# Patient Record
Sex: FEMALE | Race: WHITE
[De-identification: names, ages, dates, MRNs, and addresses within clinical notes are randomized per-mention and may not be internally consistent; named-entity substitution may affect disease eponyms.]

---

## 2023-10-27 ENCOUNTER — HOSPITAL ENCOUNTER (EMERGENCY)
Age: 33
Discharge: HOME | End: 2023-10-27
Payer: COMMERCIAL

## 2023-10-27 VITALS — BODY MASS INDEX: 34.9 KG/M2

## 2023-10-27 VITALS
RESPIRATION RATE: 16 BRPM | SYSTOLIC BLOOD PRESSURE: 148 MMHG | DIASTOLIC BLOOD PRESSURE: 91 MMHG | OXYGEN SATURATION: 98 % | HEART RATE: 87 BPM | TEMPERATURE: 98 F

## 2023-10-27 DIAGNOSIS — M79.672: Primary | ICD-10-CM

## 2023-10-27 PROCEDURE — 99281 EMR DPT VST MAYX REQ PHY/QHP: CPT

## 2023-10-27 PROCEDURE — 73630 X-RAY EXAM OF FOOT: CPT

## 2023-10-27 PROCEDURE — 99283 EMERGENCY DEPT VISIT LOW MDM: CPT

## 2025-01-30 ENCOUNTER — HOSPITAL ENCOUNTER (INPATIENT)
Dept: HOSPITAL 73 - ED | Age: 35
LOS: 4 days | Discharge: HOME | DRG: 59 | End: 2025-02-03
Payer: COMMERCIAL

## 2025-01-30 VITALS — SYSTOLIC BLOOD PRESSURE: 129 MMHG | HEART RATE: 104 BPM | DIASTOLIC BLOOD PRESSURE: 67 MMHG | OXYGEN SATURATION: 96 %

## 2025-01-30 VITALS — OXYGEN SATURATION: 96 % | SYSTOLIC BLOOD PRESSURE: 142 MMHG | DIASTOLIC BLOOD PRESSURE: 82 MMHG | HEART RATE: 102 BPM

## 2025-01-30 VITALS — OXYGEN SATURATION: 97 % | HEART RATE: 117 BPM

## 2025-01-30 VITALS
OXYGEN SATURATION: 99 % | SYSTOLIC BLOOD PRESSURE: 143 MMHG | RESPIRATION RATE: 16 BRPM | DIASTOLIC BLOOD PRESSURE: 88 MMHG | HEART RATE: 112 BPM

## 2025-01-30 VITALS — SYSTOLIC BLOOD PRESSURE: 153 MMHG | DIASTOLIC BLOOD PRESSURE: 84 MMHG | HEART RATE: 73 BPM

## 2025-01-30 VITALS — HEART RATE: 95 BPM | OXYGEN SATURATION: 98 % | DIASTOLIC BLOOD PRESSURE: 90 MMHG | SYSTOLIC BLOOD PRESSURE: 134 MMHG

## 2025-01-30 VITALS — OXYGEN SATURATION: 100 % | HEART RATE: 86 BPM

## 2025-01-30 VITALS — OXYGEN SATURATION: 97 % | DIASTOLIC BLOOD PRESSURE: 108 MMHG | HEART RATE: 82 BPM | SYSTOLIC BLOOD PRESSURE: 159 MMHG

## 2025-01-30 VITALS — HEART RATE: 86 BPM | SYSTOLIC BLOOD PRESSURE: 161 MMHG | DIASTOLIC BLOOD PRESSURE: 88 MMHG | OXYGEN SATURATION: 98 %

## 2025-01-30 VITALS — HEART RATE: 132 BPM | OXYGEN SATURATION: 93 %

## 2025-01-30 VITALS — OXYGEN SATURATION: 97 % | DIASTOLIC BLOOD PRESSURE: 93 MMHG | HEART RATE: 94 BPM | SYSTOLIC BLOOD PRESSURE: 182 MMHG

## 2025-01-30 VITALS — OXYGEN SATURATION: 95 % | HEART RATE: 114 BPM

## 2025-01-30 VITALS — SYSTOLIC BLOOD PRESSURE: 159 MMHG | OXYGEN SATURATION: 97 % | HEART RATE: 84 BPM | DIASTOLIC BLOOD PRESSURE: 85 MMHG

## 2025-01-30 VITALS — OXYGEN SATURATION: 97 % | SYSTOLIC BLOOD PRESSURE: 143 MMHG | DIASTOLIC BLOOD PRESSURE: 88 MMHG | HEART RATE: 117 BPM

## 2025-01-30 VITALS — SYSTOLIC BLOOD PRESSURE: 147 MMHG | HEART RATE: 109 BPM | DIASTOLIC BLOOD PRESSURE: 78 MMHG | OXYGEN SATURATION: 97 %

## 2025-01-30 VITALS — OXYGEN SATURATION: 98 % | DIASTOLIC BLOOD PRESSURE: 84 MMHG | SYSTOLIC BLOOD PRESSURE: 146 MMHG | HEART RATE: 105 BPM

## 2025-01-30 VITALS — OXYGEN SATURATION: 98 % | SYSTOLIC BLOOD PRESSURE: 150 MMHG | DIASTOLIC BLOOD PRESSURE: 92 MMHG | HEART RATE: 85 BPM

## 2025-01-30 VITALS — SYSTOLIC BLOOD PRESSURE: 156 MMHG | HEART RATE: 112 BPM | OXYGEN SATURATION: 97 % | DIASTOLIC BLOOD PRESSURE: 78 MMHG

## 2025-01-30 VITALS
TEMPERATURE: 97.34 F | DIASTOLIC BLOOD PRESSURE: 126 MMHG | OXYGEN SATURATION: 99 % | RESPIRATION RATE: 14 BRPM | SYSTOLIC BLOOD PRESSURE: 169 MMHG | HEART RATE: 96 BPM

## 2025-01-30 VITALS — DIASTOLIC BLOOD PRESSURE: 77 MMHG | HEART RATE: 90 BPM | OXYGEN SATURATION: 97 % | SYSTOLIC BLOOD PRESSURE: 180 MMHG

## 2025-01-30 VITALS — OXYGEN SATURATION: 98 % | HEART RATE: 97 BPM | DIASTOLIC BLOOD PRESSURE: 78 MMHG | SYSTOLIC BLOOD PRESSURE: 150 MMHG

## 2025-01-30 VITALS — HEART RATE: 96 BPM | OXYGEN SATURATION: 97 % | DIASTOLIC BLOOD PRESSURE: 82 MMHG | SYSTOLIC BLOOD PRESSURE: 141 MMHG

## 2025-01-30 VITALS — DIASTOLIC BLOOD PRESSURE: 79 MMHG | OXYGEN SATURATION: 96 % | SYSTOLIC BLOOD PRESSURE: 161 MMHG | HEART RATE: 87 BPM

## 2025-01-30 VITALS — HEART RATE: 80 BPM | DIASTOLIC BLOOD PRESSURE: 82 MMHG | OXYGEN SATURATION: 99 % | SYSTOLIC BLOOD PRESSURE: 173 MMHG

## 2025-01-30 VITALS — HEART RATE: 88 BPM | OXYGEN SATURATION: 98 %

## 2025-01-30 VITALS — SYSTOLIC BLOOD PRESSURE: 132 MMHG | OXYGEN SATURATION: 98 % | HEART RATE: 93 BPM | DIASTOLIC BLOOD PRESSURE: 74 MMHG

## 2025-01-30 VITALS — BODY MASS INDEX: 35.9 KG/M2 | BODY MASS INDEX: 35.7 KG/M2

## 2025-01-30 VITALS — HEART RATE: 96 BPM | OXYGEN SATURATION: 98 %

## 2025-01-30 VITALS — OXYGEN SATURATION: 98 % | HEART RATE: 94 BPM

## 2025-01-30 VITALS — HEART RATE: 101 BPM | OXYGEN SATURATION: 97 %

## 2025-01-30 VITALS — OXYGEN SATURATION: 98 % | HEART RATE: 77 BPM

## 2025-01-30 DIAGNOSIS — Z91.141: ICD-10-CM

## 2025-01-30 DIAGNOSIS — Z98.890: ICD-10-CM

## 2025-01-30 DIAGNOSIS — Z88.0: ICD-10-CM

## 2025-01-30 DIAGNOSIS — T45.1X6A: ICD-10-CM

## 2025-01-30 DIAGNOSIS — Z87.891: ICD-10-CM

## 2025-01-30 DIAGNOSIS — G35: Primary | ICD-10-CM

## 2025-01-30 DIAGNOSIS — E23.2: ICD-10-CM

## 2025-01-30 LAB
ADD MANUAL DIFF / SLIDE REVIEW: NO
ALBUMIN SERPL-MCNC: 4.8 G/DL (ref 3.5–5)
ALBUMIN/GLOB SERPL: 1.3 {RATIO} (ref 1–2.8)
ALP SERPL-CCNC: 71 U/L (ref 38–126)
ALT SERPL-CCNC: 29 IU/L (ref ?–35)
BUN SERPL-MCNC: 7 MG/DL (ref 7–17)
CALCIUM SERPL-MCNC: 9.5 MG/DL (ref 8.4–10.2)
CHLORIDE SERPL-SCNC: 106 MMOL/L (ref 98–107)
CO2 SERPL-SCNC: 24 MMOL/L (ref 22–32)
ESTIMATED GLOMERULAR FILT RATE: > 60 ML/MIN (ref 60–?)
GLOBULIN SER CALC-MCNC: 3.7 G/DL (ref 1.7–4.1)
GLUCOSE SERPL-MCNC: 103 MG/DL (ref 70–100)
HEMATOCRIT: 42.6 % (ref 36–46)
HEMOGLOBIN: 14.8 G/DL (ref 12–16)
HEMOLYSIS: 31 (ref 0–50)
LYMPHOCYTES # SPEC AUTO: 1400 /UL (ref 1100–4500)
MCV RBC: 87.4 FL (ref 80–100)
MEAN CORPUSCULAR HEMOGLOBIN: 30.3 PG (ref 26–34)
MEAN CORPUSCULAR HGB CONC: 34.6 % (ref 30–36)
PLATELET COUNT: 359 X10^3/UL (ref 150–400)
POTASSIUM SERPL-SCNC: 4.2 MMOL/L (ref 3.4–5.1)
PROT SERPL-MCNC: 8.5 G/DL (ref 6.3–8.2)
SODIUM SERPL-SCNC: 140 MMOL/L (ref 137–145)

## 2025-01-30 PROCEDURE — 97535 SELF CARE MNGMENT TRAINING: CPT

## 2025-01-30 PROCEDURE — 97116 GAIT TRAINING THERAPY: CPT

## 2025-01-30 PROCEDURE — 97165 OT EVAL LOW COMPLEX 30 MIN: CPT

## 2025-01-30 PROCEDURE — 80053 COMPREHEN METABOLIC PANEL: CPT

## 2025-01-30 PROCEDURE — 36415 COLL VENOUS BLD VENIPUNCTURE: CPT

## 2025-01-30 PROCEDURE — 81003 URINALYSIS AUTO W/O SCOPE: CPT

## 2025-01-30 PROCEDURE — 81015 MICROSCOPIC EXAM OF URINE: CPT

## 2025-01-30 PROCEDURE — 85025 COMPLETE CBC W/AUTO DIFF WBC: CPT

## 2025-01-30 PROCEDURE — 99284 EMERGENCY DEPT VISIT MOD MDM: CPT

## 2025-01-30 PROCEDURE — 96375 TX/PRO/DX INJ NEW DRUG ADDON: CPT

## 2025-01-30 PROCEDURE — 72156 MRI NECK SPINE W/O & W/DYE: CPT

## 2025-01-30 PROCEDURE — 96361 HYDRATE IV INFUSION ADD-ON: CPT

## 2025-01-30 PROCEDURE — 80048 BASIC METABOLIC PNL TOTAL CA: CPT

## 2025-01-30 PROCEDURE — 70553 MRI BRAIN STEM W/O & W/DYE: CPT

## 2025-01-30 PROCEDURE — 81025 URINE PREGNANCY TEST: CPT

## 2025-01-30 PROCEDURE — 97162 PT EVAL MOD COMPLEX 30 MIN: CPT

## 2025-01-30 PROCEDURE — 96365 THER/PROPH/DIAG IV INF INIT: CPT

## 2025-01-30 RX ADMIN — METHYLPREDNISOLONE SODIUM SUCCINATE 250 MG: 1 INJECTION, POWDER, FOR SOLUTION INTRAMUSCULAR; INTRAVENOUS at 13:03

## 2025-01-30 NOTE — DI.MRI.S_ITS
PROCEDURE:  MR CERVICAL SPINE WO/W CON 
  
INDICATIONS:  ms flare 
  
TECHNIQUE:   
Noncontrast sagittal T1 spin echo and T2 fast spin echo, sagittal STIR, sagittal PD fast  
spin echo, foraminal oblique sagittal T2 fast spin echo, axial gradient echo or T2 fast  
spin echo through the cervical spine.  After the administration of contrast, sagittal and  
axial T1 spin echo with fat saturation through the cervical spine.   
  
COMPARISON:  [facility], MR, MR CERVICAL SPINE WITH/WITHOUT CONTRAST,  
11/04/2019, 9:09. 
  
FINDINGS:   
Image quality:  Motion degraded.   
  
Alignment and curvature:  Straightening and mild reversal the normal cervical lordosis. 
  
Marrow:  Marrow demonstrates normal overall signal.   
  
Spinal cord:  Again seen are multiple large lesions throughout the cervical cord and  
visualized upper thoracic cord.  These appear progressed compared to prior with near  
continuous lesions spanning C2 through C7 (15/10).  No enhancing lesions are seen. 
  
Paraspinous soft tissues:  No paravertebral masses or suspicious enhancement.   
  
IMPRESSION:   
  
Multiple lesions throughout the cervical cord and upper thoracic cord appear increased  
compared to prior exam with near continuous lesion spanning C2 through C7.  No enhancing  
lesions are identified.   
  
  
Dictated by: Keyon Danielle M.D. on 1/30/2025 at 15:38      
Approved by: Keyon Danielle M.D. on 1/30/2025 at 15:42

## 2025-01-30 NOTE — ED.NEUROSD
HPI - Neuro Symptoms/Deficit
<Fiordaliza Urbina DO - Last Filed: 02/04/25 21:38>
General
Chief Complaint: Neuro Symptoms/Deficit
Stated Complaint: MS flare
Time Seen by Provider: 01/30/25 11:52
Mode of arrival: Wheelchair
History of Present Illness
HPI Narrative: 
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  She reports increasing left-sided weakness it has been ongoing for 
couple of weeks.  She has been unable to afford her MS medication cut the co-pay ran out.  She is followed by  at [facility].  She denies any fever chills cough sore throat or infection like symptoms
Related Data
Home Medications

 Medication  Instructions  Recorded  Confirmed
desmopressin 0.1 mg tablet 0.05 mg PO BID 01/31/25 01/31/25
ergocalciferol (vitamin D2) 1,250 1,250 mcg PO DAILY 01/31/25 01/31/25
mcg (50,000 unit) capsule   


Allergies

Allergy/AdvReac Type Severity Reaction Status Date / Time
adhesive tape Allergy  Rash Verified 01/30/25 10:36
lavender (Lavandula Allergy  Migraine Verified 01/30/25 10:36
angustifolia)     
Penicillins Allergy  Hives Verified 01/30/25 10:36



Patient History
<Fiordaliza Urbina DO - Last Filed: 02/04/25 21:38>
Social History (Reviewed 02/02/25 @ 09:16 by Bryant Hartmann MD)
household members:  spouse, children and other 
Smoking Status:  Former smoker 
alcohol intake:  current 


Smoking Status: Former smoker
alcohol intake frequency: holidays/special occasions only

Exam
<Fiordaliza Urbina DO - Last Filed: 02/04/25 21:38>
Initial Vital Signs
Initial Vital Signs: 

Vital Signs

Temperature  97.3 F L  01/30/25 10:36
Pulse Rate  96 H  01/30/25 10:36
Respiratory Rate  14   01/30/25 10:36
Blood Pressure  169/126 H  01/30/25 10:36
Pulse Oximetry  99   01/30/25 10:36
Oxygen Delivery Method  Room Air  01/30/25 10:36


GENERAL:  Alert pleasant well-appearing 34-year-old female and in [no acute] distress.
HEENT: Head atraumatic,EOMI, pupils reactive, face symmetric, [moist] mucous membranes  
CARDIOVASCULAR: Regular rate and rhythm without murmurs, rubs or gallops.
RESPIRATORY: Breath sounds equal bilaterally, no wheezes rales or rhonchi.
ABDOMEN: Soft, nontender.  Normoactive bowel sounds all 4 quadrants.  No guarding or rebound.
EXTREMITIES: Normal range of motion, no clubbing or edema.  Neurovascularly intact
NEUROLOGICAL: Alert and oriented x4.Normal gait and speech. Cranial nerves II through XII grossly intact.   [Good finger-to-nose, good heel-to-shin, strength equal bilaterally, no dysarthria or aphasia, sensation in tact to soft touch bilaterally, 
no visual changes, no facial droop]
SKIN: Warm, dry, no laceration, no petechiae, no rashes or lesions.

<Shirley Celaya MD - Last Filed: 01/31/25 02:25>
Initial Vital Signs
Initial Vital Signs: 

Vital Signs

Temperature  97.3 F L  01/30/25 10:36
Pulse Rate  96 H  01/30/25 10:36
Respiratory Rate  14   01/30/25 10:36
Blood Pressure  169/126 H  01/30/25 10:36
Pulse Oximetry  99   01/30/25 10:36
Oxygen Delivery Method  Room Air  01/30/25 10:36




Course
<Fiordaliza Urbina DO - Last Filed: 02/04/25 21:38>
Orders
Ordered: 




Discontinued Medications

Acetaminophen (Acetaminophen 325 Mg Tablet)  650 mg PO Q6H PRN
 PRN Reason: Fever/Mild Pain (1-3)
Hydrocodone Bitart/Acetaminophen (Hydrocodone/Acet 5/325 Tablet)  1 tab PO Q4H PRN
 PRN Reason: Pain, Moderate (4-6)
Methylprednisolone 1,000 mg/ (Sodium Chloride)  250 mls @ 250 mls/hr IV NOW ONE
 Stop: 01/30/25 12:44
 Last Infusion: 01/30/25 14:04  Dose: Infused
 Documented By: FLLESIA
 Admin: 01/30/25 13:03  Dose: 250 mls/hr
 Documented By: JEREMIAH
Sodium Chloride (Normal Saline 0.9%)  1,000 mls @ 75 mls/hr IV CONT ALEX
 Last Admin: 01/31/25 19:43  Dose: 75 mls/hr
 Documented By: LINH
 Infusion: 01/31/25 16:51  Dose: Infused
 Documented By: LINH
 Admin: 01/31/25 03:31  Dose: 75 mls/hr
 Documented By: EZIO
Methylprednisolone 1,000 mg/ (Sodium Chloride)  250 mls @ 250 mls/hr IV Q24H [facility]
 Last Infusion: 02/03/25 13:38  Dose: Infused
 Documented By: TLS
 Admin: 02/03/25 12:01  Dose: 250 mls/hr
 Documented By: CR
 Infusion: 02/02/25 15:01  Dose: Infused
 Documented By: CR
 Admin: 02/02/25 13:28  Dose: 250 mls/hr
 Documented By: CR
 Infusion: 02/01/25 14:06  Dose: Infused
 Documented By: ANCA
 Admin: 02/01/25 13:06  Dose: 250 mls/hr
 Documented By: JANE
 Infusion: 01/31/25 14:22  Dose: Infused
 Documented By: JANE
 Admin: 01/31/25 13:22  Dose: 250 mls/hr
 Documented By: LINH
Lorazepam (Lorazepam 2 Mg/Ml Inj)  1 mg IV NOW ONE
 Stop: 01/30/25 14:22
 Last Admin: 01/30/25 14:30  Dose: 1 mg
 Documented By: NACHO
Lorazepam (Lorazepam 2 Mg/Ml Inj)  1 mg IV Q6HR PRN
 PRN Reason: Anxiety
Methylprednisolone (Methylprednisolone 125 Mg/2 Ml Vial)  1,000 mg IV Q24H [facility]
 Last Admin: 01/31/25 12:52 Dose:  Not Given
 Documented By: LINH
Naloxone HCl (Naloxone 0.4 Mg/Ml Vial)  0.2 mg IV Q2MIN PRN
 PRN Reason: Opiate Reversal
Ondansetron HCl (Ondansetron 4 Mg/2 Ml Inj)  4 mg IV Q8HR PRN
 PRN Reason: Nausea And Vomiting
Pantoprazole Sodium (Pantoprazole Dr 40 Mg Tablet)  40 mg PO 0700 [facility]
 Last Admin: 02/03/25 06:04  Dose: 40 mg
 Documented By: MAU
 Admin: 02/02/25 06:33  Dose: 40 mg
 Documented By: MD
 Admin: 02/01/25 06:54  Dose: 40 mg
 Documented By: MD
 Admin: 01/31/25 08:32  Dose: 40 mg
 Documented By: SPF



Vital Signs
Vital signs: 

Vital Signs - 8 hr

 01/30/25
18:30 01/30/25
18:30 01/30/25
19:00
Pulse Rate 105 H  102 H
Respiratory Rate   
Blood Pressure  146/84 H 
Pulse Oximetry 98  96
Oxygen Delivery Method   

 01/30/25
19:00 01/30/25
19:30 01/30/25
19:30
Pulse Rate  112 H 130 H
Respiratory Rate  16 
Blood Pressure 142/82 H 143/88 H 
Pulse Oximetry  99 96
Oxygen Delivery Method  Room Air 

 01/30/25
19:31 01/30/25
19:31 01/30/25
20:00
Pulse Rate 117 H  104 H
Respiratory Rate   
Blood Pressure  143/88 H 
Pulse Oximetry 97  96
Oxygen Delivery Method   

 01/30/25
20:00 01/30/25
20:04 01/30/25
20:04
Pulse Rate   109 H
Respiratory Rate   
Blood Pressure 129/67 147/78 H 
Pulse Oximetry   97
Oxygen Delivery Method   

 01/30/25
20:30 01/30/25
21:04 01/30/25
21:17
Pulse Rate 114 H 132 H 112 H
Respiratory Rate   
Blood Pressure   
Pulse Oximetry 95 93 97
Oxygen Delivery Method   

 01/30/25
21:17 01/30/25
21:30
Pulse Rate  117 H
Respiratory Rate  
Blood Pressure 156/78 H 
Pulse Oximetry  97
Oxygen Delivery Method  




<Shirley Celaya MD - Last Filed: 01/31/25 02:25>
Orders
Ordered: 




Discontinued Medications

Acetaminophen (Acetaminophen 325 Mg Tablet)  650 mg PO Q6H PRN
 PRN Reason: Fever/Mild Pain (1-3)
Hydrocodone Bitart/Acetaminophen (Hydrocodone/Acet 5/325 Tablet)  1 tab PO Q4H PRN
 PRN Reason: Pain, Moderate (4-6)
Methylprednisolone 1,000 mg/ (Sodium Chloride)  250 mls @ 250 mls/hr IV NOW ONE
 Stop: 01/30/25 12:44
 Last Infusion: 01/30/25 14:04  Dose: Infused
 Documented By: MICHELLE
 Admin: 01/30/25 13:03  Dose: 250 mls/hr
 Documented By: JEREMIAH
Sodium Chloride (Normal Saline 0.9%)  1,000 mls @ 75 mls/hr IV CONT ALXE
 Last Admin: 01/31/25 19:43  Dose: 75 mls/hr
 Documented By: LINH
 Infusion: 01/31/25 16:51  Dose: Infused
 Documented By: LINH
 Admin: 01/31/25 03:31  Dose: 75 mls/hr
 Documented By: EZIO
Methylprednisolone 1,000 mg/ (Sodium Chloride)  250 mls @ 250 mls/hr IV Q24H [facility]
 Last Infusion: 02/03/25 13:38  Dose: Infused
 Documented By: MISHA
 Admin: 02/03/25 12:01  Dose: 250 mls/hr
 Documented By: ANCA
 Infusion: 02/02/25 15:01  Dose: Infused
 Documented By: ANCA
 Admin: 02/02/25 13:28  Dose: 250 mls/hr
 Documented By: ANCA
 Infusion: 02/01/25 14:06  Dose: Infused
 Documented By: ANCA
 Admin: 02/01/25 13:06  Dose: 250 mls/hr
 Documented By: JANE
 Infusion: 01/31/25 14:22  Dose: Infused
 Documented By: JANE
 Admin: 01/31/25 13:22  Dose: 250 mls/hr
 Documented By: LINH
Lorazepam (Lorazepam 2 Mg/Ml Inj)  1 mg IV NOW ONE
 Stop: 01/30/25 14:22
 Last Admin: 01/30/25 14:30  Dose: 1 mg
 Documented By: NACHO
Lorazepam (Lorazepam 2 Mg/Ml Inj)  1 mg IV Q6HR PRN
 PRN Reason: Anxiety
Methylprednisolone (Methylprednisolone 125 Mg/2 Ml Vial)  1,000 mg IV Q24H [facility]
 Last Admin: 01/31/25 12:52 Dose:  Not Given
 Documented By: LINH
Naloxone HCl (Naloxone 0.4 Mg/Ml Vial)  0.2 mg IV Q2MIN PRN
 PRN Reason: Opiate Reversal
Ondansetron HCl (Ondansetron 4 Mg/2 Ml Inj)  4 mg IV Q8HR PRN
 PRN Reason: Nausea And Vomiting
Pantoprazole Sodium (Pantoprazole Dr 40 Mg Tablet)  40 mg PO 0700 [facility]
 Last Admin: 02/03/25 06:04  Dose: 40 mg
 Documented By: MAU
 Admin: 02/02/25 06:33  Dose: 40 mg
 Documented By: MD
 Admin: 02/01/25 06:54  Dose: 40 mg
 Documented By: MD
 Admin: 01/31/25 08:32  Dose: 40 mg
 Documented By: SPF



Vital Signs
Vital signs: 

Vital Signs - 8 hr

 01/30/25
18:30 01/30/25
18:30 01/30/25
19:00
Pulse Rate 105 H  102 H
Respiratory Rate   
Blood Pressure  146/84 H 
Pulse Oximetry 98  96
Oxygen Delivery Method   

 01/30/25
19:00 01/30/25
19:30 01/30/25
19:30
Pulse Rate  112 H 130 H
Respiratory Rate  16 
Blood Pressure 142/82 H 143/88 H 
Pulse Oximetry  99 96
Oxygen Delivery Method  Room Air 

 01/30/25
19:31 01/30/25
19:31 01/30/25
20:00
Pulse Rate 117 H  104 H
Respiratory Rate   
Blood Pressure  143/88 H 
Pulse Oximetry 97  96
Oxygen Delivery Method   

 01/30/25
20:00 01/30/25
20:04 01/30/25
20:04
Pulse Rate   109 H
Respiratory Rate   
Blood Pressure 129/67 147/78 H 
Pulse Oximetry   97
Oxygen Delivery Method   

 01/30/25
20:30 01/30/25
21:04 01/30/25
21:17
Pulse Rate 114 H 132 H 112 H
Respiratory Rate   
Blood Pressure   
Pulse Oximetry 95 93 97
Oxygen Delivery Method   

 01/30/25
21:17 01/30/25
21:30
Pulse Rate  117 H
Respiratory Rate  
Blood Pressure 156/78 H 
Pulse Oximetry  97
Oxygen Delivery Method  




MDM - Neuro Symptoms/Deficit
<Fiordaliza Urbina, DO - Last Filed: 02/04/25 21:38>
Lab Data

01/31/25 05:55          

01/31/25 05:55          

Labs: 

Lab Results

  01/30/25 01/30/25 Range/Units
  12:15 12:37 
WBC   11.2 H  (4.5-11.0)  X10^3/uL
RBC   4.87  (4.0-5.2)  X10^6/uL
Hgb   14.8  (12.0-16.0)  g/dL
Hct   42.6  (36-46)  %
MCV   87.4  ()  fL
MCH   30.3  (26-34)  PG
MCHC   34.6  (30-36)  %
RDW   14.5  (11.6-14.8)  %
Plt Count   359  (150-400)  X10^3/uL
Neut % (Auto)   77.8 H  (50-75)  %
Lymph % (Auto)   13.0 L  (25-40)  %
Mono % (Auto)   5.9  (3-14)  %
Eos % (Auto)   2.7  (2-4)  %
Baso % (Auto)   0.6  (0-2)  %
Neut # (Auto)   8700 H  (3889-6507)  /uL
Lymph # (Auto)   1400  (6688-8431)  /uL
Mono # (Auto)   700  (0-900)  /uL
Eos # (Auto)   300  (0-450)  /uL
Baso # (Auto)   100  (0-100)  /uL
Sodium   140  (137-145)  mmol/L
Potassium   4.2  (3.4-5.1)  mmol/L
Chloride   106  ()  mmol/L
Carbon Dioxide   24  (22-32)  mmol/L
BUN   7  (7-17)  mg/dL
Creatinine   0.80  (0.52-1.04)  mg/dL
Estimated GFR   > 60  (>60)  mL/min
BUN/Creatinine Ratio   8.8  (6-22)  
Glucose   103 H  ()  mg/dL
Calcium   9.5  (8.4-10.2)  mg/dL
Total Bilirubin   1.0  (0.2-1.3)  mg/dL
AST   33  (14-36)  IU/L
ALT   29  (<35)  IU/L
Alkaline Phosphatase   71  ()  U/L
Total Protein   8.5 H  (6.3-8.2)  g/dL
Albumin   4.8  (3.5-5.0)  g/dL
Globulin   3.7  (1.7-4.1)  g/dL
Albumin/Globulin Ratio   1.3  (1.0-2.8)  
Urine RBC  0-1/hpf   (0-5/HPF)  
Urine WBC  1-5/hpf   (0-5/HPF)  
Ur Squamous Epith Cells  1-5 /hpf   (0-5/HPF)  
Urine Bacteria  Few (2-10) H   (None)  
Ur Culture Indicated?  Cult not indicated   
Vol Urine Centrifuged  10ml (spun)   

Point of Care Testing

Pregnancy Test Results         Negative                                         

Urine Dip

Bedside Urine Glucose          Negative                                          
Bedside Urine Bilirubin        - Negative                                        
Bedside Urine Ketone           - Negative                                        
Urine Specific Gravity         1.005                                             
Bedside Urine Occult Blood     - Negative                                        
Bedside Urine pH               7.0                                               
Bedside Urine Protein          - Negative                                        
Bedside Urine Urobilinogen     - Negative                                        
Bedside Urine Nitrite          - Negative                                        
Bedside Urine Leukocytes       +/- 15                                            
Esterase                       




Imaging Data
MR brain: 
      Radiologist's Impression: 





PROCEDURE:  MR HEAD/BRAIN WO/W CON
 
INDICATIONS:  ms flare
 
TECHNIQUE:  
Noncontrast sagittal and axial FLAIR, axial and coronal T2 fast spin echo, axial VIBE, 
axial gradient echo, axial diffusion and ADC through the brain.  After the administration 
of contrast, axial and coronal and sagittal VIBE with fat saturation through the brain.  
 
COMPARISON:  [facility], , MR MS BRAIN WITH/WITHOUT CONTRAST, 11/04/2019, 
9:09.
 
FINDINGS:  
Image quality:  Excellent.  
 
CSF spaces:  Ventricles are normal in size and shape.  Basal cisterns are patent.  No 
extra-axial fluid collections.  
 
Brain:  Compared to 11/04/2019, there is significant progression of subcortical and 
periventricular white matter lesions with involvement of the corpus callosum.  There are 
also several new infratentorial lesions including lesions in the brainstem.  Larger 
lesions demonstrate T1 hypointense signal.  No intracranial bleeds or mass effects.  
Gray-white matter interface appears intact. No abnormal intracranial enhancement.  
Diffusion weighted images show no acute ischemic insults.  Brainstem appears normal.  
Normal intravascular flow voids are present.  
 
Skull and face:  Calvarial marrow signal is normal.  Orbits appear normal.  
 
Sinuses:  Mild right greater than left maxillary sinus mucosal thickening.  Sinuses and 
mastoids are otherwise clear.  
 
 
IMPRESSION:  
 
Significant progression of white matter lesions compared to 11/04/2019.  No enhancing or 
diffusion restricting lesions are seen.
 
Tumefactive demyelinating lesions are not excluded for some of the larger lesions and 
short-term follow-up brain MRI with and without contrast in 3-6 months is recommended.
 
 
 
Dictated by: Keyon Danielle M.D. on 1/30/2025 at 15:29     
MR Cervical: 
      Radiologist's Impression: 




PROCEDURE:  MR CERVICAL SPINE WO/W CON
 
INDICATIONS:  ms flare
 
TECHNIQUE:  
Noncontrast sagittal T1 spin echo and T2 fast spin echo, sagittal STIR, sagittal PD fast 
spin echo, foraminal oblique sagittal T2 fast spin echo, axial gradient echo or T2 fast 
spin echo through the cervical spine.  After the administration of contrast, sagittal and 
axial T1 spin echo with fat saturation through the cervical spine.  
 
COMPARISON:  [facility], MR, MR CERVICAL SPINE WITH/WITHOUT CONTRAST, 
11/04/2019, 9:09.
 
FINDINGS:  
Image quality:  Motion degraded.  
 
Alignment and curvature:  Straightening and mild reversal the normal cervical lordosis.
 
Marrow:  Marrow demonstrates normal overall signal.  
 
Spinal cord:  Again seen are multiple large lesions throughout the cervical cord and 
visualized upper thoracic cord.  These appear progressed compared to prior with near 
continuous lesions spanning C2 through C7 (15/10).  No enhancing lesions are seen.
 
Paraspinous soft tissues:  No paravertebral masses or suspicious enhancement.  
 
IMPRESSION:  
 
Multiple lesions throughout the cervical cord and upper thoracic cord appear increased 
compared to prior exam with near continuous lesion spanning C2 through C7.  No enhancing 
lesions are identified.  
 
 
Dictated by: Keyon Danielle M.D. on 1/30/2025 at 15:38     
Approved by: Keyon Danielle M.D. on 1/30/2025 at 15:42   
[facility] Narrative
Medical decision making narrative: 
Patient is a 34-year-old female presenting to day with worsening left-sided weakness.  She was felt an MS flare coming on for some time.  Unable to afford her medication.
Blood work today is overall reassuring mild leukocytosis of 11.2 electrolytes stable no EDGAR urinalysis negative for UTI

12:40 Dr. Hunt, neurology updated patient's symptoms test results reports that she has not been seen since April would really like an MRI brain and C-spine with and without contrast.  But does agree with 3 days of IV Solu-Medrol.

Patient reports that she has nipple rings that have grown over and unable to get an MRI.
Actually they are MRI compatible able to get MRI quickly

, request transfer for plasmapheresis based on MRI
System at capacity all hospitalist are full.




<Shirley Celaya MD - Last Filed: 01/31/25 02:25>
Lab Data
Labs: 

Lab Results

  01/30/25 01/30/25 Range/Units
  12:15 12:37 
WBC   11.2 H  (4.5-11.0)  X10^3/uL
RBC   4.87  (4.0-5.2)  X10^6/uL
Hgb   14.8  (12.0-16.0)  g/dL
Hct   42.6  (36-46)  %
MCV   87.4  ()  fL
MCH   30.3  (26-34)  PG
MCHC   34.6  (30-36)  %
RDW   14.5  (11.6-14.8)  %
Plt Count   359  (150-400)  X10^3/uL
Neut % (Auto)   77.8 H  (50-75)  %
Lymph % (Auto)   13.0 L  (25-40)  %
Mono % (Auto)   5.9  (3-14)  %
Eos % (Auto)   2.7  (2-4)  %
Baso % (Auto)   0.6  (0-2)  %
Neut # (Auto)   8700 H  (5809-1600)  /uL
Lymph # (Auto)   1400  (5289-4221)  /uL
Mono # (Auto)   700  (0-900)  /uL
Eos # (Auto)   300  (0-450)  /uL
Baso # (Auto)   100  (0-100)  /uL
Sodium   140  (137-145)  mmol/L
Potassium   4.2  (3.4-5.1)  mmol/L
Chloride   106  ()  mmol/L
Carbon Dioxide   24  (22-32)  mmol/L
BUN   7  (7-17)  mg/dL
Creatinine   0.80  (0.52-1.04)  mg/dL
Estimated GFR   > 60  (>60)  mL/min
BUN/Creatinine Ratio   8.8  (6-22)  
Glucose   103 H  ()  mg/dL
Calcium   9.5  (8.4-10.2)  mg/dL
Total Bilirubin   1.0  (0.2-1.3)  mg/dL
AST   33  (14-36)  IU/L
ALT   29  (<35)  IU/L
Alkaline Phosphatase   71  ()  U/L
Total Protein   8.5 H  (6.3-8.2)  g/dL
Albumin   4.8  (3.5-5.0)  g/dL
Globulin   3.7  (1.7-4.1)  g/dL
Albumin/Globulin Ratio   1.3  (1.0-2.8)  
Urine RBC  0-1/hpf   (0-5/HPF)  
Urine WBC  1-5/hpf   (0-5/HPF)  
Ur Squamous Epith Cells  1-5 /hpf   (0-5/HPF)  
Urine Bacteria  Few (2-10) H   (None)  
Ur Culture Indicated?  Cult not indicated   
Vol Urine Centrifuged  10ml (spun)   

Point of Care Testing

Pregnancy Test Results         Negative                                         

Urine Dip

Bedside Urine Glucose          Negative                                          
Bedside Urine Bilirubin        - Negative                                        
Bedside Urine Ketone           - Negative                                        
Urine Specific Gravity         1.005                                             
Bedside Urine Occult Blood     - Negative                                        
Bedside Urine pH               7.0                                               
Bedside Urine Protein          - Negative                                        
Bedside Urine Urobilinogen     - Negative                                        
Bedside Urine Nitrite          - Negative                                        
Bedside Urine Leukocytes       +/- 15                                            
Esterase                       




MDM Narrative
Medical decision making narrative: 
Patient is a 34-year-old female presenting to day with worsening left-sided weakness.  She was felt an MS flare coming on for some time.  Unable to afford her medication.
Blood work today is overall reassuring mild leukocytosis of 11.2 electrolytes stable no EDGAR urinalysis negative for UTI

12:40 Dr. Hunt, neurology updated patient's symptoms test results reports that she has not been seen since April would really like an MRI brain and C-spine with and without contrast.  But does agree with 3 days of IV Solu-Medrol.

Patient reports that she has nipple rings that have grown over and unable to get an MRI.
Actually they are MRI compatible able to get MRI quickly

, request transfer for plasmapheresis based on MRI
System at capacity all hospitalist are full.


Dr. Celaya - Care of patient signed to me by daytime physician. Pending transfer request.


[id number]  - Case discussed with [facility]. Pending review of images prior to paging neurologist.

0223 - Discussed with Dr. Garay of neurology at . He reviewed the images and the case - recommends IV steroids only. No need for transfer. No need for plasma exchange. Case discussed with Dr. Menjivar of tele-medicine, who will admit to [facility].

Discharge Plan
Departure
Patient Disposition: Admitted As Inpatient

Clinical Impression:
 Exacerbation of multiple sclerosis


Admit Date/Time: 01/31/25 02:31

Admit Provider: Giovani Menjivar

## 2025-01-30 NOTE — ED_ITS
HPI - Neuro Symptoms/Deficit    
<Fiordaliza Urbina DO - Last Filed: 02/04/25 21:38>    
General    
Chief Complaint: Neuro Symptoms/Deficit    
Stated Complaint: MS flare    
Time Seen by Provider: 01/30/25 11:52    
Mode of arrival: Wheelchair    
History of Present Illness    
HPI Narrative:     
Patient is a 34-year-old female history of multiple sclerosis, diabetes   
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   
thinks was an MS flare.  She reports increasing left-sided weakness it has been   
ongoing for couple of weeks.  She has been unable to afford her MS medication   
cut the co-pay ran out.  She is followed by  at [facility]   
Neurology.  She denies any fever chills cough sore throat or infection like   
symptoms    
Related Data    
                                Home Medications    
    
    
    
 Medication  Instructions  Recorded  Confirmed    
     
desmopressin 0.1 mg tablet 0.05 mg PO BID 01/31/25 01/31/25    
     
ergocalciferol (vitamin D2) 1,250 1,250 mcg PO DAILY 01/31/25 01/31/25    
    
mcg (50,000 unit) capsule       
    
    
    
                                    Allergies    
    
    
    
Allergy/AdvReac Type Severity Reaction Status Date / Time    
     
adhesive tape Allergy  Rash Verified 01/30/25 10:36    
     
lavender (Lavandula Allergy  Migraine Verified 01/30/25 10:36    
    
angustifolia)         
     
Penicillins Allergy  Hives Verified 01/30/25 10:36    
    
    
    
    
Patient History    
<Fiordaliza Urbina DO - Last Filed: 02/04/25 21:38>    
Social History (Reviewed 02/02/25 @ 09:16 by Bryant Hartmann MD)    
household members:  spouse, children and other     
Smoking Status:  Former smoker     
alcohol intake:  current     
    
    
Smoking Status: Former smoker    
alcohol intake frequency: holidays/special occasions only    
    
Exam    
<Fiordaliza Urbina DO - Last Filed: 02/04/25 21:38>    
Initial Vital Signs    
Initial Vital Signs:     
    
Vital Signs    
    
    
    
Temperature  97.3 F L  01/30/25 10:36    
     
Pulse Rate  96 H  01/30/25 10:36    
     
Respiratory Rate  14   01/30/25 10:36    
     
Blood Pressure  169/126 H  01/30/25 10:36    
     
Pulse Oximetry  99   01/30/25 10:36    
     
Oxygen Delivery Method  Room Air  01/30/25 10:36    
    
    
    
GENERAL:  Alert pleasant well-appearing 34-year-old female and in [no acute]   
distress.    
HEENT: Head atraumatic,EOMI, pupils reactive, face symmetric, [moist] mucous   
membranes      
CARDIOVASCULAR: Regular rate and rhythm without murmurs, rubs or gallops.    
RESPIRATORY: Breath sounds equal bilaterally, no wheezes rales or rhonchi.    
ABDOMEN: Soft, nontender.  Normoactive bowel sounds all 4 quadrants.  No   
guarding or rebound.    
EXTREMITIES: Normal range of motion, no clubbing or edema.  Neurovascularly   
intact    
NEUROLOGICAL: Alert and oriented x4.Normal gait and speech. Cranial nerves II   
through XII grossly intact.   [Good finger-to-nose, good heel-to-shin, strength   
equal bilaterally, no dysarthria or aphasia, sensation in tact to soft touch   
bilaterally, no visual changes, no facial droop]    
SKIN: Warm, dry, no laceration, no petechiae, no rashes or lesions.    
    
<Shirley Celaya MD - Last Filed: 01/31/25 02:25>    
Initial Vital Signs    
Initial Vital Signs:     
    
Vital Signs    
    
    
    
Temperature  97.3 F L  01/30/25 10:36    
     
Pulse Rate  96 H  01/30/25 10:36    
     
Respiratory Rate  14   01/30/25 10:36    
     
Blood Pressure  169/126 H  01/30/25 10:36    
     
Pulse Oximetry  99   01/30/25 10:36    
     
Oxygen Delivery Method  Room Air  01/30/25 10:36    
    
    
    
    
    
Course    
<Fiordaliza Urbina DO - Last Filed: 02/04/25 21:38>    
Orders    
Ordered:     
    
                                            
    
    
Discontinued Medications    
    
Acetaminophen (Acetaminophen 325 Mg Tablet)  650 mg PO Q6H PRN    
   PRN Reason: Fever/Mild Pain (1-3)    
Hydrocodone Bitart/Acetaminophen (Hydrocodone/Acet 5/325 Tablet)  1 tab PO Q4H   
PRN    
   PRN Reason: Pain, Moderate (4-6)    
Methylprednisolone 1,000 mg/ (Sodium Chloride)  250 mls @ 250 mls/hr IV NOW ONE    
   Stop: 01/30/25 12:44    
   Last Infusion: 01/30/25 14:04  Dose: Infused    
   Documented By: FLLESIA    
   Admin: 01/30/25 13:03  Dose: 250 mls/hr    
   Documented By: JEREMIAH    
Sodium Chloride (Normal Saline 0.9%)  1,000 mls @ 75 mls/hr IV CONT ALEX    
   Last Admin: 01/31/25 19:43  Dose: 75 mls/hr    
   Documented By: LINH    
   Infusion: 01/31/25 16:51  Dose: Infused    
   Documented By: LINH    
   Admin: 01/31/25 03:31  Dose: 75 mls/hr    
   Documented By: EZIO    
Methylprednisolone 1,000 mg/ (Sodium Chloride)  250 mls @ 250 mls/hr IV Q24H [facility]    
   Last Infusion: 02/03/25 13:38  Dose: Infused    
   Documented By: TLS    
   Admin: 02/03/25 12:01  Dose: 250 mls/hr    
   Documented By: CR    
   Infusion: 02/02/25 15:01  Dose: Infused    
   Documented By: CR    
   Admin: 02/02/25 13:28  Dose: 250 mls/hr    
   Documented By: CR    
   Infusion: 02/01/25 14:06  Dose: Infused    
   Documented By: ANCA    
   Admin: 02/01/25 13:06  Dose: 250 mls/hr    
   Documented By: JANE    
   Infusion: 01/31/25 14:22  Dose: Infused    
   Documented By: JANE    
   Admin: 01/31/25 13:22  Dose: 250 mls/hr    
   Documented By: LINH    
Lorazepam (Lorazepam 2 Mg/Ml Inj)  1 mg IV NOW ONE    
   Stop: 01/30/25 14:22    
   Last Admin: 01/30/25 14:30  Dose: 1 mg    
   Documented By: NACHO    
Lorazepam (Lorazepam 2 Mg/Ml Inj)  1 mg IV Q6HR PRN    
   PRN Reason: Anxiety    
Methylprednisolone (Methylprednisolone 125 Mg/2 Ml Vial)  1,000 mg IV Q24H [facility]    
   Last Admin: 01/31/25 12:52 Dose:  Not Given    
   Documented By: LINH    
Naloxone HCl (Naloxone 0.4 Mg/Ml Vial)  0.2 mg IV Q2MIN PRN    
   PRN Reason: Opiate Reversal    
Ondansetron HCl (Ondansetron 4 Mg/2 Ml Inj)  4 mg IV Q8HR PRN    
   PRN Reason: Nausea And Vomiting    
Pantoprazole Sodium (Pantoprazole Dr 40 Mg Tablet)  40 mg PO 0700 [facility]    
   Last Admin: 02/03/25 06:04  Dose: 40 mg    
   Documented By: MAU    
   Admin: 02/02/25 06:33  Dose: 40 mg    
   Documented By: MD    
   Admin: 02/01/25 06:54  Dose: 40 mg    
   Documented By: MD    
   Admin: 01/31/25 08:32  Dose: 40 mg    
   Documented By: SPF    
    
    
    
Vital Signs    
Vital signs:     
    
                               Vital Signs - 8 hr    
    
    
    
 01/30/25    
18:30 01/30/25    
18:30 01/30/25    
19:00    
     
Pulse Rate 105 H  102 H    
     
Respiratory Rate       
     
Blood Pressure  146/84 H     
     
Pulse Oximetry 98  96    
     
Oxygen Delivery Method       
    
    
    
    
 01/30/25    
19:00 01/30/25    
19:30 01/30/25    
19:30    
     
Pulse Rate  112 H 130 H    
     
Respiratory Rate  16     
     
Blood Pressure 142/82 H 143/88 H     
     
Pulse Oximetry  99 96    
     
Oxygen Delivery Method  Room Air     
    
    
    
    
 01/30/25    
19:31 01/30/25    
19:31 01/30/25    
20:00    
     
Pulse Rate 117 H  104 H    
     
Respiratory Rate       
     
Blood Pressure  143/88 H     
     
Pulse Oximetry 97  96    
     
Oxygen Delivery Method       
    
    
    
    
 01/30/25    
20:00 01/30/25    
20:04 01/30/25    
20:04    
     
Pulse Rate   109 H    
     
Respiratory Rate       
     
Blood Pressure 129/67 147/78 H     
     
Pulse Oximetry   97    
     
Oxygen Delivery Method       
    
    
    
    
 01/30/25    
20:30 01/30/25    
21:04 01/30/25    
21:17    
     
Pulse Rate 114 H 132 H 112 H    
     
Respiratory Rate       
     
Blood Pressure       
     
Pulse Oximetry 95 93 97    
     
Oxygen Delivery Method       
    
    
    
    
 01/30/25    
21:17 01/30/25    
21:30    
     
Pulse Rate  117 H    
     
Respiratory Rate      
     
Blood Pressure 156/78 H     
     
Pulse Oximetry  97    
     
Oxygen Delivery Method      
    
    
    
    
    
<Shirley Celaya MD - Last Filed: 01/31/25 02:25>    
Orders    
Ordered:     
    
                                            
    
    
Discontinued Medications    
    
Acetaminophen (Acetaminophen 325 Mg Tablet)  650 mg PO Q6H PRN    
   PRN Reason: Fever/Mild Pain (1-3)    
Hydrocodone Bitart/Acetaminophen (Hydrocodone/Acet 5/325 Tablet)  1 tab PO Q4H   
PRN    
   PRN Reason: Pain, Moderate (4-6)    
Methylprednisolone 1,000 mg/ (Sodium Chloride)  250 mls @ 250 mls/hr IV NOW ONE    
   Stop: 01/30/25 12:44    
   Last Infusion: 01/30/25 14:04  Dose: Infused    
   Documented By: MICHELLE    
   Admin: 01/30/25 13:03  Dose: 250 mls/hr    
   Documented By: JEREMIAH    
Sodium Chloride (Normal Saline 0.9%)  1,000 mls @ 75 mls/hr IV CONT ALEX    
   Last Admin: 01/31/25 19:43  Dose: 75 mls/hr    
   Documented By: LINH    
   Infusion: 01/31/25 16:51  Dose: Infused    
   Documented By: LINH    
   Admin: 01/31/25 03:31  Dose: 75 mls/hr    
   Documented By: EZIO    
Methylprednisolone 1,000 mg/ (Sodium Chloride)  250 mls @ 250 mls/hr IV Q24H [facility]    
   Last Infusion: 02/03/25 13:38  Dose: Infused    
   Documented By: MISHA    
   Admin: 02/03/25 12:01  Dose: 250 mls/hr    
   Documented By: ANCA    
   Infusion: 02/02/25 15:01  Dose: Infused    
   Documented By: ANCA    
   Admin: 02/02/25 13:28  Dose: 250 mls/hr    
   Documented By: ANCA    
   Infusion: 02/01/25 14:06  Dose: Infused    
   Documented By: ANCA    
   Admin: 02/01/25 13:06  Dose: 250 mls/hr    
   Documented By: JANE    
   Infusion: 01/31/25 14:22  Dose: Infused    
   Documented By: JANE    
   Admin: 01/31/25 13:22  Dose: 250 mls/hr    
   Documented By: LINH    
Lorazepam (Lorazepam 2 Mg/Ml Inj)  1 mg IV NOW ONE    
   Stop: 01/30/25 14:22    
   Last Admin: 01/30/25 14:30  Dose: 1 mg    
   Documented By: NACHO    
Lorazepam (Lorazepam 2 Mg/Ml Inj)  1 mg IV Q6HR PRN    
   PRN Reason: Anxiety    
Methylprednisolone (Methylprednisolone 125 Mg/2 Ml Vial)  1,000 mg IV Q24H [facility]    
   Last Admin: 01/31/25 12:52 Dose:  Not Given    
   Documented By: LINH    
Naloxone HCl (Naloxone 0.4 Mg/Ml Vial)  0.2 mg IV Q2MIN PRN    
   PRN Reason: Opiate Reversal    
Ondansetron HCl (Ondansetron 4 Mg/2 Ml Inj)  4 mg IV Q8HR PRN    
   PRN Reason: Nausea And Vomiting    
Pantoprazole Sodium (Pantoprazole Dr 40 Mg Tablet)  40 mg PO 0700 [facility]    
   Last Admin: 02/03/25 06:04  Dose: 40 mg    
   Documented By: MAU    
   Admin: 02/02/25 06:33  Dose: 40 mg    
   Documented By: MD    
   Admin: 02/01/25 06:54  Dose: 40 mg    
   Documented By: MD    
   Admin: 01/31/25 08:32  Dose: 40 mg    
   Documented By: SPF    
    
    
    
Vital Signs    
Vital signs:     
    
                               Vital Signs - 8 hr    
    
    
    
 01/30/25    
18:30 01/30/25    
18:30 01/30/25    
19:00    
     
Pulse Rate 105 H  102 H    
     
Respiratory Rate       
     
Blood Pressure  146/84 H     
     
Pulse Oximetry 98  96    
     
Oxygen Delivery Method       
    
    
    
    
 01/30/25    
19:00 01/30/25    
19:30 01/30/25    
19:30    
     
Pulse Rate  112 H 130 H    
     
Respiratory Rate  16     
     
Blood Pressure 142/82 H 143/88 H     
     
Pulse Oximetry  99 96    
     
Oxygen Delivery Method  Room Air     
    
    
    
    
 01/30/25    
19:31 01/30/25    
19:31 01/30/25    
20:00    
     
Pulse Rate 117 H  104 H    
     
Respiratory Rate       
     
Blood Pressure  143/88 H     
     
Pulse Oximetry 97  96    
     
Oxygen Delivery Method       
    
    
    
    
 01/30/25    
20:00 01/30/25    
20:04 01/30/25    
20:04    
     
Pulse Rate   109 H    
     
Respiratory Rate       
     
Blood Pressure 129/67 147/78 H     
     
Pulse Oximetry   97    
     
Oxygen Delivery Method       
    
    
    
    
 01/30/25    
20:30 01/30/25    
21:04 01/30/25    
21:17    
     
Pulse Rate 114 H 132 H 112 H    
     
Respiratory Rate       
     
Blood Pressure       
     
Pulse Oximetry 95 93 97    
     
Oxygen Delivery Method       
    
    
    
    
 01/30/25    
21:17 01/30/25    
21:30    
     
Pulse Rate  117 H    
     
Respiratory Rate      
     
Blood Pressure 156/78 H     
     
Pulse Oximetry  97    
     
Oxygen Delivery Method      
    
    
    
    
    
MDM - Neuro Symptoms/Deficit    
<Fiordaliza Urbina, DO - Last Filed: 02/04/25 21:38>    
Lab Data    
    
                                                        01/31/25 05:55              
    
                                                        01/31/25 05:55              
    
Labs:     
    
                                   Lab Results    
    
    
    
  01/30/25 01/30/25 Range/Units    
    
  12:15 12:37     
     
WBC   11.2 H  (4.5-11.0)  X10^3/uL    
     
RBC   4.87  (4.0-5.2)  X10^6/uL    
     
Hgb   14.8  (12.0-16.0)  g/dL    
     
Hct   42.6  (36-46)  %    
     
MCV   87.4  ()  fL    
     
MCH   30.3  (26-34)  PG    
     
MCHC   34.6  (30-36)  %    
     
RDW   14.5  (11.6-14.8)  %    
     
Plt Count   359  (150-400)  X10^3/uL    
     
Neut % (Auto)   77.8 H  (50-75)  %    
     
Lymph % (Auto)   13.0 L  (25-40)  %    
     
Mono % (Auto)   5.9  (3-14)  %    
     
Eos % (Auto)   2.7  (2-4)  %    
     
Baso % (Auto)   0.6  (0-2)  %    
     
Neut # (Auto)   8700 H  (3793-6770)  /uL    
     
Lymph # (Auto)   1400  (1723-3232)  /uL    
     
Mono # (Auto)   700  (0-900)  /uL    
     
Eos # (Auto)   300  (0-450)  /uL    
     
Baso # (Auto)   100  (0-100)  /uL    
     
Sodium   140  (137-145)  mmol/L    
     
Potassium   4.2  (3.4-5.1)  mmol/L    
     
Chloride   106  ()  mmol/L    
     
Carbon Dioxide   24  (22-32)  mmol/L    
     
BUN   7  (7-17)  mg/dL    
     
Creatinine   0.80  (0.52-1.04)  mg/dL    
     
Estimated GFR   > 60  (>60)  mL/min    
     
BUN/Creatinine Ratio   8.8  (6-22)      
     
Glucose   103 H  ()  mg/dL    
     
Calcium   9.5  (8.4-10.2)  mg/dL    
     
Total Bilirubin   1.0  (0.2-1.3)  mg/dL    
     
AST   33  (14-36)  IU/L    
     
ALT   29  (<35)  IU/L    
     
Alkaline Phosphatase   71  ()  U/L    
     
Total Protein   8.5 H  (6.3-8.2)  g/dL    
     
Albumin   4.8  (3.5-5.0)  g/dL    
     
Globulin   3.7  (1.7-4.1)  g/dL    
     
Albumin/Globulin Ratio   1.3  (1.0-2.8)      
     
Urine RBC  0-1/hpf   (0-5/HPF)      
     
Urine WBC  1-5/hpf   (0-5/HPF)      
     
Ur Squamous Epith Cells  1-5 /hpf   (0-5/HPF)      
     
Urine Bacteria  Few (2-10) H   (None)      
     
Ur Culture Indicated?  Cult not indicated       
     
Vol Urine Centrifuged  10ml (spun)       
    
    
                              Point of Care Testing    
    
Pregnancy Test Results           Negative                                         
       
    
                                    Urine Dip    
    
    
    
Bedside Urine Glucose          Negative                                           
    
     
Bedside Urine Bilirubin        - Negative                                         
    
     
Bedside Urine Ketone           - Negative                                         
    
     
Urine Specific Gravity         1.005                                              
    
     
Bedside Urine Occult Blood     - Negative                                         
    
     
Bedside Urine pH               7.0                                                
    
     
Bedside Urine Protein          - Negative                                         
    
     
Bedside Urine Urobilinogen     - Negative                                         
    
     
Bedside Urine Nitrite          - Negative                                         
    
     
Bedside Urine Leukocytes       +/- 15                                             
    
    
Esterase                           
    
    
    
    
    
Imaging Data    
MR brain:     
      Radiologist's Impression:     
    
    
    
    
    
PROCEDURE:  MR HEAD/BRAIN WO/W CON    
     
INDICATIONS:  ms flare    
     
TECHNIQUE:      
Noncontrast sagittal and axial FLAIR, axial and coronal T2 fast spin echo, axial  
VIBE,     
axial gradient echo, axial diffusion and ADC through the brain.  After the   
administration     
of contrast, axial and coronal and sagittal VIBE with fat saturation through the  
brain.      
     
COMPARISON:  [facility], , MR MS BRAIN WITH/WITHOUT CONTRAST,   
11/04/2019,     
9:09.    
     
FINDINGS:      
Image quality:  Excellent.      
     
CSF spaces:  Ventricles are normal in size and shape.  Basal cisterns are   
patent.  No     
extra-axial fluid collections.      
     
Brain:  Compared to 11/04/2019, there is significant progression of subcortical   
and     
periventricular white matter lesions with involvement of the corpus callosum.    
There are     
also several new infratentorial lesions including lesions in the brainstem.    
Larger     
lesions demonstrate T1 hypointense signal.  No intracranial bleeds or mass   
effects.      
Gray-white matter interface appears intact. No abnormal intracranial   
enhancement.      
Diffusion weighted images show no acute ischemic insults.  Brainstem appears   
normal.      
Normal intravascular flow voids are present.      
     
Skull and face:  Calvarial marrow signal is normal.  Orbits appear normal.      
     
Sinuses:  Mild right greater than left maxillary sinus mucosal thickening.    
Sinuses and     
mastoids are otherwise clear.      
     
     
IMPRESSION:      
     
Significant progression of white matter lesions compared to 11/04/2019.  No   
enhancing or     
diffusion restricting lesions are seen.    
     
Tumefactive demyelinating lesions are not excluded for some of the larger   
lesions and     
short-term follow-up brain MRI with and without contrast in 3-6 months is   
recommended.    
     
     
     
Dictated by: Keyon Danielle M.D. on 1/30/2025 at 15:29         
MR Cervical:     
      Radiologist's Impression:     
    
    
    
    
PROCEDURE:  MR CERVICAL SPINE WO/W CON    
     
INDICATIONS:  ms flare    
     
TECHNIQUE:      
Noncontrast sagittal T1 spin echo and T2 fast spin echo, sagittal STIR, sagittal  
PD fast     
spin echo, foraminal oblique sagittal T2 fast spin echo, axial gradient echo or   
T2 fast     
spin echo through the cervical spine.  After the administration of contrast,   
sagittal and     
axial T1 spin echo with fat saturation through the cervical spine.      
     
COMPARISON:  [facility], MR, MR CERVICAL SPINE WITH/WITHOUT   
CONTRAST,     
11/04/2019, 9:09.    
     
FINDINGS:      
Image quality:  Motion degraded.      
     
Alignment and curvature:  Straightening and mild reversal the normal cervical   
lordosis.    
     
Marrow:  Marrow demonstrates normal overall signal.      
     
Spinal cord:  Again seen are multiple large lesions throughout the cervical cord  
and     
visualized upper thoracic cord.  These appear progressed compared to prior with   
near     
continuous lesions spanning C2 through C7 (15/10).  No enhancing lesions are   
seen.    
     
Paraspinous soft tissues:  No paravertebral masses or suspicious enhancement.      
     
IMPRESSION:      
     
Multiple lesions throughout the cervical cord and upper thoracic cord appear   
increased     
compared to prior exam with near continuous lesion spanning C2 through C7.  No   
enhancing     
lesions are identified.      
     
     
Dictated by: Keyon Danielle M.D. on 1/30/2025 at 15:38         
Approved by: Keyon Danielle M.D. on 1/30/2025 at 15:42       
[facility] Narrative    
Medical decision making narrative:     
Patient is a 34-year-old female presenting to day with worsening left-sided   
weakness.  She was felt an MS flare coming on for some time.  Unable to afford   
her medication.    
Blood work today is overall reassuring mild leukocytosis of 11.2 electrolytes   
stable no EDGAR urinalysis negative for UTI    
    
12:40 Dr. Hunt, neurology updated patient's symptoms test results reports   
that she has not been seen since April would really like an MRI brain and C-  
spine with and without contrast.  But does agree with 3 days of IV Solu-Medrol.    
    
Patient reports that she has nipple rings that have grown over and unable to get  
an MRI.    
Actually they are MRI compatible able to get MRI quickly    
    
, request transfer for plasmapheresis based on MRI    
System at capacity all hospitalist are full.    
    
    
    
    
<Shirley Celaya MD - Last Filed: 01/31/25 02:25>    
Lab Data    
Labs:     
    
                                   Lab Results    
    
    
    
  01/30/25 01/30/25 Range/Units    
    
  12:15 12:37     
     
WBC   11.2 H  (4.5-11.0)  X10^3/uL    
     
RBC   4.87  (4.0-5.2)  X10^6/uL    
     
Hgb   14.8  (12.0-16.0)  g/dL    
     
Hct   42.6  (36-46)  %    
     
MCV   87.4  ()  fL    
     
MCH   30.3  (26-34)  PG    
     
MCHC   34.6  (30-36)  %    
     
RDW   14.5  (11.6-14.8)  %    
     
Plt Count   359  (150-400)  X10^3/uL    
     
Neut % (Auto)   77.8 H  (50-75)  %    
     
Lymph % (Auto)   13.0 L  (25-40)  %    
     
Mono % (Auto)   5.9  (3-14)  %    
     
Eos % (Auto)   2.7  (2-4)  %    
     
Baso % (Auto)   0.6  (0-2)  %    
     
Neut # (Auto)   8700 H  (2776-3449)  /uL    
     
Lymph # (Auto)   1400  (8173-0056)  /uL    
     
Mono # (Auto)   700  (0-900)  /uL    
     
Eos # (Auto)   300  (0-450)  /uL    
     
Baso # (Auto)   100  (0-100)  /uL    
     
Sodium   140  (137-145)  mmol/L    
     
Potassium   4.2  (3.4-5.1)  mmol/L    
     
Chloride   106  ()  mmol/L    
     
Carbon Dioxide   24  (22-32)  mmol/L    
     
BUN   7  (7-17)  mg/dL    
     
Creatinine   0.80  (0.52-1.04)  mg/dL    
     
Estimated GFR   > 60  (>60)  mL/min    
     
BUN/Creatinine Ratio   8.8  (6-22)      
     
Glucose   103 H  ()  mg/dL    
     
Calcium   9.5  (8.4-10.2)  mg/dL    
     
Total Bilirubin   1.0  (0.2-1.3)  mg/dL    
     
AST   33  (14-36)  IU/L    
     
ALT   29  (<35)  IU/L    
     
Alkaline Phosphatase   71  ()  U/L    
     
Total Protein   8.5 H  (6.3-8.2)  g/dL    
     
Albumin   4.8  (3.5-5.0)  g/dL    
     
Globulin   3.7  (1.7-4.1)  g/dL    
     
Albumin/Globulin Ratio   1.3  (1.0-2.8)      
     
Urine RBC  0-1/hpf   (0-5/HPF)      
     
Urine WBC  1-5/hpf   (0-5/HPF)      
     
Ur Squamous Epith Cells  1-5 /hpf   (0-5/HPF)      
     
Urine Bacteria  Few (2-10) H   (None)      
     
Ur Culture Indicated?  Cult not indicated       
     
Vol Urine Centrifuged  10ml (spun)       
    
    
                              Point of Care Testing    
    
Pregnancy Test Results           Negative                                         
       
    
                                    Urine Dip    
    
    
    
Bedside Urine Glucose          Negative                                           
    
     
Bedside Urine Bilirubin        - Negative                                         
    
     
Bedside Urine Ketone           - Negative                                         
    
     
Urine Specific Gravity         1.005                                              
    
     
Bedside Urine Occult Blood     - Negative                                         
    
     
Bedside Urine pH               7.0                                                
    
     
Bedside Urine Protein          - Negative                                         
    
     
Bedside Urine Urobilinogen     - Negative                                         
    
     
Bedside Urine Nitrite          - Negative                                         
    
     
Bedside Urine Leukocytes       +/- 15                                             
    
    
Esterase                           
    
    
    
    
    
MDM Narrative    
Medical decision making narrative:     
Patient is a 34-year-old female presenting to day with worsening left-sided   
weakness.  She was felt an MS flare coming on for some time.  Unable to afford   
her medication.    
Blood work today is overall reassuring mild leukocytosis of 11.2 electrolytes   
stable no EDGAR urinalysis negative for UTI    
    
12:40 Dr. Hunt, neurology updated patient's symptoms test results reports   
that she has not been seen since April would really like an MRI brain and C-  
spine with and without contrast.  But does agree with 3 days of IV Solu-Medrol.    
    
Patient reports that she has nipple rings that have grown over and unable to get  
an MRI.    
Actually they are MRI compatible able to get MRI quickly    
    
, request transfer for plasmapheresis based on MRI    
System at capacity all hospitalist are full.    
    
    
Dr. Celaya - Care of patient signed to me by daytime physician. Pending transfer  
request.    
    
    
0136  - Case discussed with [facility]. Pending review of images prior to paging  
neurologist.    
    
0223 - Discussed with Dr. Garay of neurology at . He reviewed the images and   
the case - recommends IV steroids only. No need for transfer. No need for plasma  
exchange. Case discussed with Dr. Menjivar of tele-medicine, who will admit to   
[facility].    
    
Discharge Plan    
Departure    
Patient Disposition: Admitted As Inpatient    
    
Clinical Impression:    
 Exacerbation of multiple sclerosis    
    
    
Admit Date/Time: 01/31/25 02:31    
    
Admit Provider: Giovani Menjivar

## 2025-01-30 NOTE — DI.MRI.S_ITS
PROCEDURE:  MR HEAD/BRAIN WO/W CON 
  
INDICATIONS:  ms flare 
  
TECHNIQUE:   
Noncontrast sagittal and axial FLAIR, axial and coronal T2 fast spin echo, axial VIBE,  
axial gradient echo, axial diffusion and ADC through the brain.  After the administration  
of contrast, axial and coronal and sagittal VIBE with fat saturation through the brain.   
  
COMPARISON:  [facility], MR, MR MS BRAIN WITH/WITHOUT CONTRAST, 11/04/2019,  
9:09. 
  
FINDINGS:   
Image quality:  Excellent.   
  
CSF spaces:  Ventricles are normal in size and shape.  Basal cisterns are patent.  No  
extra-axial fluid collections.   
  
Brain:  Compared to 11/04/2019, there is significant progression of subcortical and  
periventricular white matter lesions with involvement of the corpus callosum.  There are  
also several new infratentorial lesions including lesions in the brainstem.  Larger  
lesions demonstrate T1 hypointense signal.  No intracranial bleeds or mass effects.   
Gray-white matter interface appears intact. No abnormal intracranial enhancement.   
Diffusion weighted images show no acute ischemic insults.  Brainstem appears normal.   
Normal intravascular flow voids are present.   
  
Skull and face:  Calvarial marrow signal is normal.  Orbits appear normal.   
  
Sinuses:  Mild right greater than left maxillary sinus mucosal thickening.  Sinuses and  
mastoids are otherwise clear.   
  
  
IMPRESSION:   
  
Significant progression of white matter lesions compared to 11/04/2019.  No enhancing or  
diffusion restricting lesions are seen. 
  
Tumefactive demyelinating lesions are not excluded for some of the larger lesions and  
short-term follow-up brain MRI with and without contrast in 3-6 months is recommended. 
  
  
  
Dictated by: Keyon Danielle M.D. on 1/30/2025 at 15:29      
Approved by: Keyon Danielle M.D. on 1/30/2025 at 15:38

## 2025-01-30 NOTE — PM.CALLCOV.1
Call Coverage Note
Note
Date of Patient Contact: 01/30/25
Narrative of Care Provided: 
This is a 34 year old female with PMH of MS who presents with unilateral weakness. Her MRI shows significant lesions as noted below. Given her age, patient I feel would benefit from admission to a hospital with neurology service availability and 
access to plasma exchange therapy. Recommend transfer for higher level of care. 

Brain MRI: 

Significant progression of white matter lesions compared to 11/04/2019.  No enhancing or 
diffusion restricting lesions are seen.
 
Tumefactive demyelinating lesions are not excluded for some of the larger lesions and 
short-term follow-up brain MRI with and without contrast in 3-6 months is recommended.

C-spine MRI : 

Multiple lesions throughout the cervical cord and upper thoracic cord appear increased 
compared to prior exam with near continuous lesion spanning C2 through C7.  No enhancing 
lesions are identified.

## 2025-01-30 NOTE — PC.NURSE
Weakness in leg improved after admin of solumedrol, see MAR. Patient able to ambulate to bathroom with cane and stand by assistance

## 2025-01-30 NOTE — PC.NURSE
Patient here in department for reported MS flare that started around Thanksgiving this past year, symptoms have gotten progressively worse in the past week. Patient reports left leg and left arm weakness and numbness can feel  warmth from touch  but 
is otherwise numb, numbness is now spreading into her upper right leg. Cannot walk further than a couple feet. Was sent by neurologist for imaging but reports she has nipple piercings that need to be professionally removed. Stopped taking medication 
prescribed for MS because its  83311 dollars a month

## 2025-01-31 VITALS — HEART RATE: 97 BPM | OXYGEN SATURATION: 97 % | RESPIRATION RATE: 18 BRPM

## 2025-01-31 VITALS
RESPIRATION RATE: 18 BRPM | HEART RATE: 91 BPM | OXYGEN SATURATION: 97 % | SYSTOLIC BLOOD PRESSURE: 146 MMHG | DIASTOLIC BLOOD PRESSURE: 78 MMHG

## 2025-01-31 VITALS
RESPIRATION RATE: 15 BRPM | DIASTOLIC BLOOD PRESSURE: 84 MMHG | SYSTOLIC BLOOD PRESSURE: 150 MMHG | HEART RATE: 92 BPM | TEMPERATURE: 97.34 F | OXYGEN SATURATION: 98 %

## 2025-01-31 VITALS
DIASTOLIC BLOOD PRESSURE: 102 MMHG | OXYGEN SATURATION: 96 % | HEART RATE: 80 BPM | TEMPERATURE: 97.7 F | SYSTOLIC BLOOD PRESSURE: 150 MMHG | RESPIRATION RATE: 19 BRPM

## 2025-01-31 VITALS — OXYGEN SATURATION: 99 % | HEART RATE: 78 BPM | RESPIRATION RATE: 16 BRPM

## 2025-01-31 VITALS
OXYGEN SATURATION: 97 % | HEART RATE: 87 BPM | SYSTOLIC BLOOD PRESSURE: 133 MMHG | TEMPERATURE: 97.52 F | RESPIRATION RATE: 16 BRPM | DIASTOLIC BLOOD PRESSURE: 96 MMHG

## 2025-01-31 VITALS — OXYGEN SATURATION: 97 % | RESPIRATION RATE: 15 BRPM | HEART RATE: 92 BPM

## 2025-01-31 VITALS
RESPIRATION RATE: 18 BRPM | OXYGEN SATURATION: 99 % | TEMPERATURE: 97.52 F | DIASTOLIC BLOOD PRESSURE: 86 MMHG | HEART RATE: 86 BPM | SYSTOLIC BLOOD PRESSURE: 150 MMHG

## 2025-01-31 VITALS
OXYGEN SATURATION: 96 % | HEART RATE: 97 BPM | RESPIRATION RATE: 21 BRPM | DIASTOLIC BLOOD PRESSURE: 77 MMHG | SYSTOLIC BLOOD PRESSURE: 152 MMHG

## 2025-01-31 VITALS — OXYGEN SATURATION: 99 % | RESPIRATION RATE: 15 BRPM | HEART RATE: 69 BPM

## 2025-01-31 LAB
ADD MANUAL DIFF / SLIDE REVIEW: NO
BUN SERPL-MCNC: 7 MG/DL (ref 7–17)
CALCIUM SERPL-MCNC: 9.6 MG/DL (ref 8.4–10.2)
CHLORIDE SERPL-SCNC: 107 MMOL/L (ref 98–107)
CO2 SERPL-SCNC: 20 MMOL/L (ref 22–32)
ESTIMATED GLOMERULAR FILT RATE: > 60 ML/MIN (ref 60–?)
GLUCOSE SERPL-MCNC: 179 MG/DL (ref 70–100)
HEMATOCRIT: 42.5 % (ref 36–46)
HEMOGLOBIN: 14.5 G/DL (ref 12–16)
HEMOLYSIS: 15 (ref 0–50)
LYMPHOCYTES # SPEC AUTO: 800 /UL (ref 1100–4500)
MCV RBC: 87.2 FL (ref 80–100)
MEAN CORPUSCULAR HEMOGLOBIN: 29.7 PG (ref 26–34)
MEAN CORPUSCULAR HGB CONC: 34.1 % (ref 30–36)
PLATELET COUNT: 386 X10^3/UL (ref 150–400)
POTASSIUM SERPL-SCNC: 4.1 MMOL/L (ref 3.4–5.1)
SODIUM SERPL-SCNC: 139 MMOL/L (ref 137–145)

## 2025-01-31 RX ADMIN — SODIUM CHLORIDE 75 ML: 0.9 INJECTION, SOLUTION INTRAVENOUS at 19:43

## 2025-01-31 RX ADMIN — METHYLPREDNISOLONE SODIUM SUCCINATE 250 MG: 1 INJECTION, POWDER, FOR SOLUTION INTRAMUSCULAR; INTRAVENOUS at 13:22

## 2025-01-31 RX ADMIN — PANTOPRAZOLE SODIUM 40 MG: 40 TABLET, DELAYED RELEASE ORAL at 08:32

## 2025-01-31 RX ADMIN — SODIUM CHLORIDE 75 ML: 0.9 INJECTION, SOLUTION INTRAVENOUS at 03:31

## 2025-01-31 NOTE — PM.HP.IH.1
History of Present Illness
History of Present Illness
Date Patient Seen: 01/31/25
Time Patient Seen: 14:20
Date of Onset of Symptoms: 01/30/25
Chief complaint: MS flare
Narrative: 
From night physician 
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  Per the patient's report, the patient has noticed that her left sided extremities over the last 
few weeks.  The patient states that she is being followed by Dr. Najera at [facility] neurology but over the last few weeks, the patient has not been able to afford her MS medications and hasn't been taking them.  The patient otherwise denies 
any fever, chills, nausea, vomiting, diarrhea, dysuria, chest pain, shortness of breath or coughing.
In our ER,  the patient was hemodynamically stable.  The patient does have weakness on her left exremities.  Dr. Najera, patient neurologist was contacted and reccommended to obtain MRI of brain and C spine which were done and shows multiple 
leasions.  Our ER physician did report these findings back to patient neurologist as well as consulting Dr. Garay, neurologist on call at .  Images were reviewed again and recommended that the patient does not need for plasma exchange at this time 
but only high dose of IV steroids.  Dr. Garay that we admit the patient here with IV steroids and PT/OT. 1G of IV Solumedrol given.  

Interval history
Patient reports that she has had dramatic improvement in her left leg weakness, and that her left hand starting to improve after her 1st dose of IV steroids.  She notes that her symptoms had been controlled for 10 years but flared when her insurance 
would only cover Gilenya at a cost of 8000 dollars per month, she was unable to afford.  She is since switched to a different insurance carrier.  She is seen with her  Abebe and a friend at bedside.

Former[facility]
Social History (Reviewed 01/31/25 @ 18:48 by Bryant Hartmann MD)
household members:  spouse, children and other 
Smoking Status:  Former smoker 
alcohol intake:  current 



Meds
Home Medications and Allergies
Home Medications

 Medication  Instructions  Recorded  Confirmed  Type
desmopressin 0.1 mg tablet 0.05 mg PO BID 01/31/25 01/31/25 History
ergocalciferol (vitamin D2) 1,250 1,250 mcg PO DAILY 01/31/25 01/31/25 History
mcg (50,000 unit) capsule    


Allergies

Allergy/AdvReac Type Severity Reaction Status Date / Time
adhesive tape Allergy  Rash Verified 01/30/25 10:36
lavender (Lavandula Allergy  Migraine Verified 01/30/25 10:36
angustifolia)     
Penicillins Allergy  Hives Verified 01/30/25 10:36



Review of Systems
Review of Systems
ROS: Yes All systems reviewed with the patient and are negative except as otherwise documented

Exam
Vital Signs
(past 8 hours): 
-

 01/31/25
11:00 01/31/25
15:00
Temperature 97.4 F L 97.5 F L
Pulse Rate 92 H 87
Respiratory Rate 15 16
Blood Pressure 150/84 H 133/96 H
Pulse Oximetry 98 97
Oxygen Flow Rate 0 0



Oxygen Delivery Method         Room Air                                          
Oxygen Flow Rate               0                                                 



Narrative
Exam Narrative: 
GENERAL: This is a well-nourished, well-developed patient, in no apparent distress.
HEAD: Atraumatic. Normocephalic. No temporal or scalp tenderness.
EYES: Pupils equal round and reactive. Extraocular motions intact. No scleral icterus. No injection or drainage. 
ENT: Mucous membranes pink and moist.
NECK: Trachea midline. No JVD, bruits or lymphadenopathy. Supple, nontender, no meningeal signs.
CARDIOVASCULAR: Regular rate and rhythm without murmurs, gallops, or rubs. 
RESPIRATORY: Clear to auscultation.
GASTROINTESTINAL: Abdomen soft, non-tender, nondistended.
EXTREMITIES: No clubbing, cyanosis, or edema.
BACK: Nontender without deformity or crepitance. No flank tenderness.
NEUROLOGIC: Alert, oriented, speech fluent, full upper and lower motor strength, mild decreased dexterity in the left hand with subjective numbness in the left hand, though markedly improved, with resolution of left leg weakness and numbness 
overnight, no other focal deficits evident.
DERMATOLOGIC: No rashes or skin lesions.


Objective
Imaging
*: 
      Radiologist's impression: 
1. Brain MRI 01/30/2025: 
Significant progression of white matter lesions compared to 11/04/2019.  No enhancing or 
diffusion restricting lesions are seen.
Tumefactive demyelinating lesions are not excluded for some of the larger lesions and 
short-term follow-up brain MRI with and without contrast in 3-6 months is recommended.

2. Cervical spine MRI 01/30/2025: 
Multiple lesions throughout the cervical cord and upper thoracic cord appear increased 
compared to prior exam with near continuous lesion spanning C2 through C7.  No enhancing 
lesions are identified.  
Labs

01/31/25 05:55          

01/31/25 05:55          

Labs: 
Laboratory Results - last 24 hr

  01/31/25
  05:55
WBC  21.8 H D
RBC  4.87
Hgb  14.5
Hct  42.5
MCV  87.2
MCH  29.7
MCHC  34.1
RDW  14.3
Plt Count  386
Neut % (Auto)  95.0 H
Lymph % (Auto)  3.6 L
Mono % (Auto)  0.9 L
Eos % (Auto)  0.0 L
Baso % (Auto)  0.5
Neut # (Auto)  70609 H
Lymph # (Auto)  800 L
[place] # (Auto)  200
Eos # (Auto)  0
Baso # (Auto)  100
Sodium  139
Potassium  4.1
Chloride  107
Carbon Dioxide  20 L
BUN  7
Creatinine  0.71
Estimated GFR  > 60
BUN/Creatinine Ratio  9.9
Glucose  179 H
Calcium  9.6



Assessment & Plan
Assessment & Plan narrative: 
1. Acute flare of relapsing remitting MS with left sided weakness, in the setting of discontinuation of disease modifying therapy for insurance reasons..  Admit the patient to medical telemetry.  Continue IV Solmedrol 1g q24hrs x3 days.  PT/OT.

DVT PPx hep SQ

Code status full code

Disposition home in 2 days

Quality
VTE
Deep Vein Thrombosis/Pulmonary Embolism Present on Admission: No
MIPS - Admit
I confirm the patient?s Advance Care Plan is present, Code status is documented, Surrogate decision maker is in patient?s record [If Yes, STOP here]: Yes
[facility] - Meds
'Current medications' to include all prescriptions, over-the-counter products, herbals, cannabis/cannabidiol products, and vitamin/mineral/dietary (nutritional) supplements. 
I have utilized all available resources to obtain, update, or review the patient?s current medications. [If Yes, STOP here]: Yes

 PROFEE
Charge Entry
Document charge(s): No
Charge Codes
Initial inpatient/observation care: 28992

## 2025-01-31 NOTE — PM.HP.1
History of Present Illness
History of Present Illness
Chief complaint: MS flare
Narrative: 
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  Per the patient's report, the patient has noticed that her left sided extremities over the last 
few weeks.  The patient states that she is being followed by Dr. Najera at [facility] but over the last few weeks, the patient has not been able to afford her MS medications and hasn't been taking them.  The patient otherwise denies 
any fever, chills, nausea, vomiting, diarrhea, dysuria, chest pain, shortness of breath or coughing.

In our ER,  the patient was hemodynamically stable.  The patient does have weakness on her left exremities.  Dr. Najera, patient neurologist was contacted and reccommended to obtain MRI of brain and C spine which were done and shows multiple 
leasions.  Our ER physician did report these findings back to patient neurologist as well as consulting Dr. Garay, neurologist on call at .  Images were reviewed again and recommended that the patient does not need for plasma exchange at this time 
but only high dose of IV steroids.  Dr. Garay that we admit the patient here with IV steroids and PT/OT. 1G of IV Solumedrol given.  

[facility]
Social History (Reviewed 01/30/25 @ 13:16 by Fiordaliza Urbina DO)
Smoking Status:  Former smoker 



Meds
Home Medications and Allergies
Allergies

Allergy/AdvReac Type Severity Reaction Status Date / Time
adhesive tape Allergy  Rash Verified 01/30/25 10:36
lavender (Lavandula Allergy  Migraine Verified 01/30/25 10:36
angustifolia)     
Penicillins Allergy  Hives Verified 01/30/25 10:36



Review of Systems
Review of Systems
ROS: Yes All systems reviewed with the patient and are negative except as otherwise documented

Exam
Vital Signs
(past 8 hours): 
-

 01/31/25
03:43
Pulse Rate 91 H
Respiratory Rate 18
Blood Pressure [Right Arm] 146/78 H
Pulse Oximetry 97
Oxygen Delivery Method Room Air



Oxygen Delivery Method         Room Air                                          



Narrative
Exam Narrative: 
Physical Exam:
GENERAL: The patient is not in any acute distressed. Awake and alert.
HEENT: Nonicteric sclerae, PERRLA, EOMI. Oropharynx clear. Moist mucous membranes. Conjunctivae appear well perfused.
HEART: Regular rate and rhythm without murmurs. No lower extremities edema.
LUNGS: Clear to auscultation bilaterally. No wheezing, crackles or rhonchi
ABDOMEN: Soft, positive bowel sounds, nontender.
SKIN: No rash, no excessive bruising, petechiae, or purpura.
NEUROLOGIC: AxO x 3.  Left sided weakness compared to right side.  Otherwise Cranial nerves II-XII intact without motor/sensory deficit.

Objective
Labs

01/31/25 05:55          

01/31/25 05:55          

Labs: 
Laboratory Results - last 24 hr

  01/30/25 01/30/25 01/31/25
  12:15 12:37 05:55
WBC   11.2 H  21.8 H D
RBC   4.87  4.87
Hgb   14.8  14.5
Hct   42.6  42.5
MCV   87.4  87.2
MCH   30.3  29.7
MCHC   34.6  34.1
RDW   14.5  14.3
Plt Count   359  386
Neut % (Auto)   77.8 H  95.0 H
Lymph % (Auto)   13.0 L  3.6 L
Mono % (Auto)   5.9  0.9 L
Eos % (Auto)   2.7  0.0 L
Baso % (Auto)   0.6  0.5
Neut # (Auto)   8700 H  33298 H
Lymph # (Auto)   1400  800 L
Mono # (Auto)   700  200
Eos # (Auto)   300  0
Baso # (Auto)   100  100
Sodium   140 
Potassium   4.2 
Chloride   106 
Carbon Dioxide   24 
BUN   7 
Creatinine   0.80 
Estimated GFR   > 60 
BUN/Creatinine Ratio   8.8 
Glucose   103 H 
Calcium   9.5 
Total Bilirubin   1.0 
AST   33 
ALT   29 
Alkaline Phosphatase   71 
Total Protein   8.5 H 
Albumin   4.8 
Globulin   3.7 
Albumin/Globulin Ratio   1.3 
Urine RBC  0-1/hpf  
Urine WBC  1-5/hpf  
Ur Squamous Epith Cells  1-5 /hpf  
Urine Bacteria  Few (2-10) H  
Ur Culture Indicated?  Cult not indicated  
Vol Urine Centrifuged  10ml (spun)  



Assessment & Plan
Assessment & Plan narrative: 
Acute flare up with MS with left sided weakness.  Admit the patient to medical telemetry.  Continue IV Solmedrol 1g q24hrs.  PT/OT.

DVT PPx hep SQ

Code status full code

Disposition home in 2 days
Time-Based Coding
:: 
[TOTAL MINUTES] spent with patient and on the chart (including review of chart, obtaining history, exam, reviewing outside data, placing orders, documenting exam and treatment plan, and counseling patient) on [DATE].

## 2025-01-31 NOTE — OT.IP.EVAL
Current Diagnoses

Multiple sclerosis (01/31/25)

Occupational Therapy Inpatient Evaluation/Re-Eval

M1 PT/OT-IP Prior Functional Status                        Start:  01/31/25 12:54
Freq:   AS NEEDED                                          Status: Active        
Protocol:                                                                        
 Document     01/31/25 15:09  CGR  (Rec: 01/31/25 15:25  CGR  [id number])
 Medical Review
     Prior Functional Status
      Medical History Reviewed                   Yes
      Communication                              able to make needs known.
      Mobility and Gait                          pt stated that she was
                                                 modified independent with all
                                                 mobilities and ambulation
                                                 without AD but uses a SPC
                                                 whenever she has an MS flareup
      Activities of Daily Living and IADL's      Pt states that she was IND in
                                                 all ADLs at baseline but gets
                                                 assist from her spouse during
                                                 MS flare ups.
     Social History
      Household Members                          spouse,children,other
      Living Arrangements                        House
      Number of Floors (Floors)                  One Floor
      Number of Stairs To Enter/Railing?         4 steps B narrow rails to
                                                 enter
      Home Environment                           Standard Height Toilet,Walk in
                                                 Shower
      Home Equipment                             Straight Cane,Hand Held Shower
                                                 ,Grab Bars In Shower
      Employment Status                          Full Time Employed
      Additional Social History Comment          pt works a  at
                                                 a [employer]
M2 OT-IP Current Condition                                 Start:  01/31/25 15:08
Freq:                                                      Status: Active        
Protocol:                                                                        
 Document     01/31/25 15:09  CGR  (Rec: 01/31/25 15:25  CGR  [id number])
 Occupational Therapy Current Condition
     Current Condition
      Evaluation Date                            01/31/25
      Treatment Diagnosis                        L sided weakness, MS flare up
      Diagnosis Onset Date                       1/31/25
M3 OT- IP Subjective and Pain                              Start:  01/31/25 15:08
Freq:                                                      Status: Active        
Protocol:                                                                        
 Document     01/31/25 15:09  CGR  (Rec: 01/31/25 15:25  CGR  [id number])
 OT- Subjective
     Occupational Therapy Visit Type
      Type                                       Initial Evaluation
      Visit Start Time                           14:29
      Visit Stop Time                            15:00
      Notes                                      Pt's  and friend
                                                 present throughout session.
 OT Pain Assessment
     Pain
      When Pain Assessed                         At Rest
     Pain Present
      Pain Present                               Denied Pain
M4 OT- IP ADL's                                            Start:  01/31/25 15:08
Freq:                                                      Status: Active        
Protocol:                                                                        
 Document     01/31/25 15:09  CGR  (Rec: 01/31/25 15:25  CGR  [id number])
 OT ADL-Self-Feeding
     General Evaluation
      Self-Feeding Ability                       Independent
      Areas Needing Assistance                   Drinking From Cup/Glass
 OT ADL-Grooming
     General Evaluation
      Grooming Ability                           Independent,Minimal Assistance
      Areas Needing Assistance                   Face Washing
     Comments
      OT Grooming Comments                       standing at sink IND for
                                                 washing face
                                                 min a for using a foam 
                                                 to wash hair.
 OT ADL-Oral Care
     General Eval
      Oral Care Ability                          Independent
      Areas of Assistance                        Brushing Teeth
     Comments
      Oral Care Comments                         standing at sink. Pt uses
                                                 compensatory strategies to
                                                 open tooth paste and put it on
                                                 her tooth brush.
 OT ADL-Dressing
     Comments
      OT Dressing Comments                       not performed
 OT ADL-Toileting
     General Evaluation
      Toileting Ability                          Independent
     Comments
      OT Toileting Comments                      simulated seated on toielt
 OT ADL-Bathing
     Comments
      OT Bathing Comments                        not performed
M5 OT- IP IADL's                                           Start:  01/31/25 15:08
Freq:                                                      Status: Active        
Protocol:                                                                        
 Document     01/31/25 15:09  CGR  (Rec: 01/31/25 15:25  CGR  [id number])
 OT-Instrumental Activities of Daily Living
     Deficits
      IADL Deficits Identified                   No Deficits
     Home Safety Awareness
      Awareness of Need for Assistance at Home   Good Awareness
      Ability to Problem Solve Emergency         Able to Problem Solve
       Situations                                
     Medication Management
      Medication Management                      No Deficits Identified
     Money Management
      Money Management                           No Deficits Identified
     Meal Preparation
      Meal Preparation                           Caregiver Provides Assist
     Household Chores
      Household Chores                           Caregiver Provides Assist
     Driving
      Driving Comments                           Pt states she typically drives
                                                 but does not when she has a
                                                 flare up.
M6 OT- IP Functional Cognition                             Start:  01/31/25 15:08
Freq:                                                      Status: Active        
Protocol:                                                                        
 Document     01/31/25 15:09  CGR  (Rec: 01/31/25 15:25  CGR  [id number])
 Cognitive Factors Limiting Selfcare Function
     Cognitive Ability
      Level of Alertness                         Alert
      Patient Orientation                        Name,Age,Birthday,Month,Date,
                                                 Year,Day of Week,Place,
                                                 Situation
      Attention Span Ability                     Capable of Focused Attention,
                                                 Capable of Sustained Attention
      Ability to Follow Commands                 Able to Follow Multi-Step
                                                 Commands
 OT- Vision and Hearing
     OT- Hearing Assessment
      OT- Hearing Assessment                     WFL
     OT- Vision Assessment
      Visual Acuity                              WFL
      Visual Attentiveness                       WFL
      Occular Pursuits                           WFL
      Visual Convergence                         WFL
      Vision Assessment Comments                 pt states she wears glasses
                                                 for driving.
M7 OT- IP Mobility and Balance                             Start:  01/31/25 15:08
Freq:                                                      Status: Active        
Protocol:                                                                        
 Document     01/31/25 15:09  CGR  (Rec: 01/31/25 15:25  CGR  [id number])
 OT- Bed Mobility Assessment
     Supine to Sit
      Supine to Sit Assist                       Independent
     Sit to Supine
      Sit to Supine Assist                       Independent
     Scooting
      Scooting to Edge of Bed                    Independent
      Scooting Up and Down in Bed                Independent
 OT-Transfer Assessment
     Sit to and From Stand
      Sit to and from Stand                      Contact Guard Assistance
     Transfers
      Transfer Ability                           Contact Guard Assistance
     Technique
      Transfer Destination                       Bed,Toilet
      Transfer Technique                         Stand Step Pivot
     Devices
      Transfer Assistive Devices                 Gait Belt
     Comments
      Mobility Comments                          Mobility around the room with
                                                 CGA.
 OT- Balance Assessment
     Sitting Balance and Reactions
      Static Sitting Balance Ability             Good
      Dynamic Sitting Balance Ability            Good
M8 OT- IP Objective Assessments                            Start:  01/31/25 15:08
Freq:                                                      Status: Active        
Protocol:                                                                        
 Document     01/31/25 15:09  CGR  (Rec: 01/31/25 15:25  CGR  [id number])
 OT Gross Range of Motion
     Upper Extremity Range of Motion
      Assessment                                 Within Functional Limits
 OT Strength
     Upper Extremity Strength
      Assessment                                 Left Impaired
     Comments
      Strength Comments                          L shld 4/5     R Shld 4+/5
                                                 L hand 4/5    R hand 4+/5
                                                 B arm 4/5
 OT- Coordination Assessment
     Comments
      Coordination Comments                      Pts gross and find motor
                                                 testing is delayed on both
                                                 hands for her age, however it
                                                 is more delayed to her left
                                                 hand.
 OT-Muscle Tone Assessment
     Muscle Tone
      WNL                                        No
 OT Sensation Assessment
     Comments
      Summary Comments                           Pt states that she has minimal
                                                 numbness from L proximal neck
                                                 to shld then full numbess
                                                 from shld distal on the L side
                                                 .
     Edema
      Edema                                      Present
      Edema Comments                             Pt with an infiltrated IV on
                                                 the L arm that is swollen and
                                                 painful.
M9 OT- IP Assessment and Plan                              Start:  01/31/25 15:08
Freq:                                                      Status: Active        
Protocol:                                                                        
 Document     01/31/25 15:09  CGR  (Rec: 01/31/25 15:25  CGR  [id number])
 OT Summary Assessment and Plan
     Potential
      Rehabilitation Potential                   Excellent
      Analytic Complexity at Evaluation          Low
     Summary
      OT Impairments                             Strength,Coordination,
                                                 Sensation,Functional Mobility
      Progress Towards Goals                     Progressing Toward Goals
      Assessment Summary                         Pt presents as a low
                                                 complexity evaluation s/p
                                                 admit for MS flare up with L
                                                 sided weakness. Pt is doing
                                                 well and able to perform close
                                                 to her baseline of IND but
                                                 with deficits to the LUE in
                                                 strength, coordination and
                                                 sensation. Pt is likely to be
                                                 safe for discharge home and
                                                 states that spouse just
                                                 obtained a 2ww for her use at
                                                 home. Pt will continue to
                                                 benefit from therapy services
                                                 while admitted.
     Goals
      Dressing Goal                              Independent
      Toileting Goal                             Independent
      Bathing Goal                               Independent
      Toilet Transfer Goal                       Independent
      Shower Transfer Goal                       Independent
      Days to Meet Goals                         3
     Frequency of Treatment
      Other frequency                            5x per week
     Treatment Plan
      OT Treatment Plan                          ADL Training,Functional
                                                 Mobility,Patient/Family
                                                 Education,Discharge Planning
      Other Treatment Recommendations and Next   shower, L hand activites.
       Treatment Focus                           
     Discharge Recommendations
      OT Discharge Recommendations               Home with Assistance
      Transportation Needs at Discharge          Private Vehicle

## 2025-01-31 NOTE — P.HP_ITS
History of Present Illness    
History of Present Illness    
Date Patient Seen: 01/31/25    
Time Patient Seen: 14:20    
Date of Onset of Symptoms: 01/30/25    
Chief complaint: MS flare    
Narrative:     
From night physician     
34 year old female with past medical history of MS, diabetes insipidus with   
pituitary tumor s/p removal presents with increasing left sided weakness.  Per   
the patient's report, the patient has noticed that her left sided extremities   
over the last few weeks.  The patient states that she is being followed by Dr. Najera at [facility] neurology but over the last few weeks, the patient has   
not been able to afford her MS medications and hasn't been taking them.  The   
patient otherwise denies any fever, chills, nausea, vomiting, diarrhea, dysuria,  
chest pain, shortness of breath or coughing.    
In our ER,  the patient was hemodynamically stable.  The patient does have   
weakness on her left exremities.  Dr. Najera, patient neurologist was contacted  
and reccommended to obtain MRI of brain and C spine which were done and shows   
multiple leasions.  Our ER physician did report these findings back to patient   
neurologist as well as consulting Dr. Garay, neurologist on call at .  Images   
were reviewed again and recommended that the patient does not need for plasma   
exchange at this time but only high dose of IV steroids.  Dr. Garay that we   
admit the patient here with IV steroids and PT/OT. 1G of IV Solumedrol given.      
    
Interval history    
Patient reports that she has had dramatic improvement in her left leg weakness,   
and that her left hand starting to improve after her 1st dose of IV steroids.    
She notes that her symptoms had been controlled for 10 years but flared when her  
insurance would only cover Gilenya at a cost of 8000 dollars per month, she was   
unable to afford.  She is since switched to a different insurance carrier.  She   
is seen with her  Abebe and a friend at bedside.    
    
[facility]    
Social History (Reviewed 01/31/25 @ 18:48 by Bryant Hartmann MD)    
household members:  spouse, children and other     
Smoking Status:  Former smoker     
alcohol intake:  current     
    
    
    
Meds    
Home Medications and Allergies    
                                Home Medications    
    
    
    
 Medication  Instructions  Recorded  Confirmed  Type    
     
desmopressin 0.1 mg tablet 0.05 mg PO BID 01/31/25 01/31/25 History    
     
ergocalciferol (vitamin D2) 1,250 1,250 mcg PO DAILY 01/31/25 01/31/25 History    
    
mcg (50,000 unit) capsule        
    
    
    
                                    Allergies    
    
    
    
Allergy/AdvReac Type Severity Reaction Status Date / Time    
     
adhesive tape Allergy  Rash Verified 01/30/25 10:36    
     
lavender (Lavandula Allergy  Migraine Verified 01/30/25 10:36    
    
angustifolia)         
     
Penicillins Allergy  Hives Verified 01/30/25 10:36    
    
    
    
    
Review of Systems    
Review of Systems    
ROS: Yes All systems reviewed with the patient and are negative except as   
otherwise documented    
    
Exam    
Vital Signs    
(past 8 hours):     
                                        -    
    
    
    
 01/31/25    
11:00 01/31/25    
15:00    
     
Temperature 97.4 F L 97.5 F L    
     
Pulse Rate 92 H 87    
     
Respiratory Rate 15 16    
     
Blood Pressure 150/84 H 133/96 H    
     
Pulse Oximetry 98 97    
     
Oxygen Flow Rate 0 0    
    
    
                                            
    
    
    
Oxygen Delivery Method         Room Air                                           
    
     
Oxygen Flow Rate               0                                                  
    
    
    
    
    
Narrative    
Exam Narrative:     
GENERAL: This is a well-nourished, well-developed patient, in no apparent   
distress.    
HEAD: Atraumatic. Normocephalic. No temporal or scalp tenderness.    
EYES: Pupils equal round and reactive. Extraocular motions intact. No scleral   
icterus. No injection or drainage.     
ENT: Mucous membranes pink and moist.    
NECK: Trachea midline. No JVD, bruits or lymphadenopathy. Supple, nontender, no   
meningeal signs.    
CARDIOVASCULAR: Regular rate and rhythm without murmurs, gallops, or rubs.     
RESPIRATORY: Clear to auscultation.    
GASTROINTESTINAL: Abdomen soft, non-tender, nondistended.    
EXTREMITIES: No clubbing, cyanosis, or edema.    
BACK: Nontender without deformity or crepitance. No flank tenderness.    
NEUROLOGIC: Alert, oriented, speech fluent, full upper and lower motor strength,  
mild decreased dexterity in the left hand with subjective numbness in the left   
hand, though markedly improved, with resolution of left leg weakness and   
numbness overnight, no other focal deficits evident.    
DERMATOLOGIC: No rashes or skin lesions.    
    
    
Objective    
Imaging    
*:     
      Radiologist's impression:     
1. Brain MRI 01/30/2025:     
Significant progression of white matter lesions compared to 11/04/2019.  No   
enhancing or     
diffusion restricting lesions are seen.    
Tumefactive demyelinating lesions are not excluded for some of the larger   
lesions and     
short-term follow-up brain MRI with and without contrast in 3-6 months is   
recommended.    
    
2. Cervical spine MRI 01/30/2025:     
Multiple lesions throughout the cervical cord and upper thoracic cord appear   
increased     
compared to prior exam with near continuous lesion spanning C2 through C7.  No   
enhancing     
lesions are identified.      
Labs    
    
                                                        01/31/25 05:55              
    
                                                        01/31/25 05:55              
    
Labs:     
                         Laboratory Results - last 24 hr    
    
    
    
  01/31/25    
    
  05:55    
     
WBC  21.8 H D    
     
RBC  4.87    
     
Hgb  14.5    
     
Hct  42.5    
     
MCV  87.2    
     
MCH  29.7    
     
MCHC  34.1    
     
RDW  14.3    
     
Plt Count  386    
     
Neut % (Auto)  95.0 H    
     
Lymph % (Auto)  3.6 L    
     
Mono % (Auto)  0.9 L    
     
Eos % (Auto)  0.0 L    
     
Baso % (Auto)  0.5    
     
Neut # (Auto)  11145 H    
     
Lymph # (Auto)  800 L    
     
Mono # (Auto)  200    
     
Eos # (Auto)  0    
     
Baso # (Auto)  100    
     
Sodium  139    
     
Potassium  4.1    
     
Chloride  107    
     
Carbon Dioxide  20 L    
     
BUN  7    
     
Creatinine  0.71    
     
Estimated GFR  > 60    
     
BUN/Creatinine Ratio  9.9    
     
Glucose  179 H    
     
Calcium  9.6    
    
    
    
    
Assessment & Plan    
Assessment & Plan narrative:     
1. Acute flare of relapsing remitting MS with left sided weakness, in the   
setting of discontinuation of disease modifying therapy for insurance reasons..   
Admit the patient to medical telemetry.  Continue IV Solmedrol 1g q24hrs x3   
days.  PT/OT.    
    
DVT PPx hep SQ    
    
Code status full code    
    
Disposition home in 2 days    
    
Quality    
VTE    
Deep Vein Thrombosis/Pulmonary Embolism Present on Admission: No    
MIPS - Admit    
I confirm the patient?s Advance Care Plan is present, Code status is documented,  
Surrogate decision maker is in patient?s record [If Yes, STOP here]: Yes    
[facility] - Meds    
'Current medications' to include all prescriptions, over-the-counter products,   
herbals, cannabis/cannabidiol products, and vitamin/mineral/dietary   
(nutritional) supplements.     
I have utilized all available resources to obtain, update, or review the   
patient?s current medications. [If Yes, STOP here]: Yes    
    
 PROFEE    
Charge Entry    
Document charge(s): No    
Charge Codes    
Initial inpatient/observation care: 28949

## 2025-01-31 NOTE — PT.IIE
Current Diagnoses

Multiple sclerosis (01/31/25)

Physical Therapy Inpatient Evaluation/Re-Eval

M1 PT/OT-IP Prior Functional Status                        Start:  01/31/25 12:54
Freq:   AS NEEDED                                          Status: Active        
Protocol:                                                                        
 Document     01/31/25 11:00  AB  (Rec: 01/31/25 13:07  AB  [id number])
 Medical Review
     Prior Functional Status
      Medical History Reviewed                   Yes
      Communication                              able to make needs known.
      Mobility and Gait                          pt stated that she was
                                                 modified independent with all
                                                 mobilities and ambulation
                                                 without AD but uses a SPC
                                                 whenever she has an MS flareup
     Social History
      Household Members                          spouse,children,other
      Living Arrangements                        House
      Number of Floors (Floors)                  One Floor
      Number of Stairs To Enter/Railing?         4 steps B rails to enter
      Home Environment                           Standard Height Toilet,Walk in
                                                 Shower
      Home Equipment                             Straight Cane,Hand Held Shower
                                                 ,Grab Bars In Shower
      Employment Status                          Full Time Employed
      Additional Social History Comment          pt works a 
M2 PT-IP Current Condition                                 Start:  01/31/25 12:54
Freq:   AS NEEDED                                          Status: Active        
Protocol:                                                                        
 Document     01/31/25 11:00  AB  (Rec: 01/31/25 13:07  AB  [id number])
 Physical Therapy Current Condition
     Current Condition
      Evaluation Date                            01/31/25
      Treatment Diagnosis                        MS exacerbation; difficulty in
                                                 walking
      Onset Date                                 0131/25
M3 PT-IP Subjective                                        Start:  01/31/25 12:54
Freq:   AS NEEDED                                          Status: Active        
Protocol:                                                                        
 Document     01/31/25 11:00  AB  (Rec: 01/31/25 13:07  AB  [id number])
 Subjective
     Physical Therapy Visit Type
      Type                                       Initial Evaluation
      Visit Start Time                           11:00
      Visit Stop Time                            11:15
      Number of PTA Visits                       0
     Physical Therapy Visit Comments
      Patient Comments                           agreeable to do PT
M4 PT-IP Mobility and Gait                                 Start:  01/31/25 12:54
Freq:   AS NEEDED                                          Status: Active        
Protocol:                                                                        
 Document     01/31/25 11:00  AB  (Rec: 01/31/25 13:07  AB  [id number])
 PT-Bed Mobility Assessment
     Supine to Sit
      Supine to Sit                              Independent
     Sit to Supine
      Sit to Supine                              Independent
 PT-Transfer Assessment
     Sit to and From Stand
      Sit to and from Stand                      Standby Assistance,1 Person
                                                 Assistance,Use of Upper
                                                 Extremities
     Equipment
      Transfer Assistive Device                  Straight Cane
      Orthotic/Prosthetic Devices or Brace:      No
     Comments
      Mobility Comments                          pt in bed and spouse in room.
                                                 obtained PLOF and home set up.
                                                 pt with c/o L sided numbness
                                                 and weakness. completed
                                                 supine to sit mod I.  SBA for
                                                 sitting balance. sit to stand
                                                 SBA and ambulated in room
                                                 using SPC SBA to CGA  ~ 20 ft.
                                                 presents with unsteady
                                                 antalgic gait. Assessed
                                                 ambulation using FWW and
                                                 completed ~ 20 ft SBA.
                                                 continues to have antalgic
                                                 gait but more stable compared
                                                 when using SPC.  pt went back
                                                 to bed. mod I for bed mobility
                                                 . informed pt and spouse to
                                                 get a FWW for now and agreed.
                                                 call light and table placed
                                                 within reach.
 Gait Assessment
     Gait
      Gait Assistance Required:                  Standby Assistance,Contact
                                                 Guard Assist,1 Person Assist
      Distance (Feet)                            20
     Assistive Devices
      Assistive Device                           Gait Belt,Straight Cane,Front
                                                 Wheeled Walker
      Orthotic/Prosthetic Devices or Brace:      No
     Gait Deviations
      General Gait Pattern                       Antalgic,Ataxic,Decreased
                                                 Stride Length,Decreased Feet
                                                 Clearance,Step-to Gait
     Factors Limiting Gait Function
      Factors Limiting Gait Function             Decreased Activity Tolerance,
                                                 Decreased Strength,Limited
                                                 Range of Motion,Poor Balance,
                                                 Poor Safety Awareness
 PT-Balance Assessment
     Sitting Balance and Reactions
      Static Sitting Balance Ability             Normal
      Dynamic Sitting Balance Ability            Good
     Standing Balance and Reactions
      Static Standing Balance Ability            Fair
      Dynamic Standing Balance Ability           Fair
      Device Used                                SPC
M5 PT-IP Objective Assessments                             Start:  01/31/25 12:54
Freq:   AS NEEDED                                          Status: Active        
Protocol:                                                                        
 Document     01/31/25 11:00  AB  (Rec: 01/31/25 13:07  AB  [id number])
 Orientation
     Orientation/Cognition
      Level of Alertness                         Alert
      Orientation                                Name,Age,Birthday,Month,Date,
                                                 Year,Day of Week,Place,
                                                 Situation
      Language Function Ability                  No Deficits Noted
      Safety Awareness                           Understands Safety Issues
      Memory Description                         No Deficits Noted
 Gross Range of Motion
     Lower Extremity ROM
      Assessment                                 Within Functional Limits
 Strength
     Lower Extremity Strength
      Assessment                                 Left Impaired
      Hip                                        3+/5
      Knee                                       3+/5
 Sensation Assessment
     Sensation
      Sensation Description                      Numbness
     Comments
      Sensation Comments                         L sided numbness
M6 PT-IP Treatment                                         Start:  01/31/25 12:54
Freq:   AS NEEDED                                          Status: Active        
Protocol:                                                                        
 Document     01/31/25 11:00  AB  (Rec: 01/31/25 13:07  AB  [id number])
 Physical Therapy Treatment
     Education
      Education Provided                         Safety
M7 PT-IP Assessment and Plan                               Start:  01/31/25 12:54
Freq:   AS NEEDED                                          Status: Active        
Protocol:                                                                        
 Document     01/31/25 11:00  AB  (Rec: 01/31/25 13:07  AB  [id number])
 PT Summary Assessment and Plan
     Potential
      Rehabilitation Potential                   Fair
      Status of Condition at Evaluation          Evolving
     Summary
      Impairments                                Pain,ROM,Strength,Balance,
                                                 Coordination,Sensation,Tone,
                                                 Cognition,Bed Mobility,
                                                 Transfers,Gait,Activity
                                                 Tolerance
      Assessment Summary                         pt is a 35 y/o F admitted for
                                                 MS exacerbation.  pt requiring
                                                 SBA to CGA for ambulation
                                                 using SPC/FWW. Recommending
                                                 use of FWW at this time.  will
                                                 continue to assess progress.
     Goals
      Transfer Goal                              Independent,Front Wheeled
                                                 Walker
      Gait Goal                                  Independent,Front Wheel Walker
      Gait Distance                              200
      Other Goals                                improve transfers and
                                                 ambulation using SPC/without
                                                 AD mod I ~ 300 ft
                                                 up/down 4 steps B rails SBA
      Days to Meet Goals                         10
     Frequency of Treatment
      Frequency Of Treatment                     Once a Day
     Treatment Plan
      Physical Therapy Treatment Plan            Bed Mobility Training,Transfer
                                                 Training,Gait Training,
                                                 Therapeutic Exercise,Balance
                                                 Retraining,Discharge Planning,
                                                 Hot or Cold Pack,Neuromuscular
                                                 Re-ed,Coordination Retraining
     Recommendations To Nursing
      Amount of Assist Needed                    1 Person Assist
     Discharge Recommendations
      PT Discharge Recommendations               Home with Assistance
      Transportation Needs at Discharge          Private Vehicle

## 2025-01-31 NOTE — PC.NURSE
Patient arrived from ED this morning at approximately 11 a.m. She is A&OX4, VSS, afebrile on RA. She is able to ambulate with SBA to BR. She states LLE has resolved, and tingling only remains in L hand as well as some poor L hand dexterity. PT/ OT 
evaluate patient at bedside and recommend patient to use FWW. She calls appropriately for assistance and uses FWW. She tolerates IV high -dose steroid well this afternoon c/o metalic taste in her mouth she has with medication. IVF NS at 75ml/hr 
maintenance. She denies any other complaints or symptoms this evening.  supportive at bedside.

## 2025-01-31 NOTE — P.HP_ITS
History of Present Illness    
History of Present Illness    
Chief complaint: MS flare    
Narrative:     
34 year old female with past medical history of MS, diabetes insipidus with   
pituitary tumor s/p removal presents with increasing left sided weakness.  Per   
the patient's report, the patient has noticed that her left sided extremities   
over the last few weeks.  The patient states that she is being followed by Dr. Najera at [facility] but over the last few weeks, the patient has   
not been able to afford her MS medications and hasn't been taking them.  The   
patient otherwise denies any fever, chills, nausea, vomiting, diarrhea, dysuria,  
chest pain, shortness of breath or coughing.    
    
In our ER,  the patient was hemodynamically stable.  The patient does have   
weakness on her left exremities.  Dr. Najera, patient neurologist was contacted  
and reccommended to obtain MRI of brain and C spine which were done and shows   
multiple leasions.  Our ER physician did report these findings back to patient   
neurologist as well as consulting Dr. Garay, neurologist on call at .  Images   
were reviewed again and recommended that the patient does not need for plasma   
exchange at this time but only high dose of IV steroids.  Dr. Garay that we   
admit the patient here with IV steroids and PT/OT. 1G of IV Solumedrol given.      
    
[facility]    
Social History (Reviewed 01/30/25 @ 13:16 by Fiordaliza Urbina DO)    
Smoking Status:  Former smoker     
    
    
    
Meds    
Home Medications and Allergies    
                                    Allergies    
    
    
    
Allergy/AdvReac Type Severity Reaction Status Date / Time    
     
adhesive tape Allergy  Rash Verified 01/30/25 10:36    
     
lavender (Lavandula Allergy  Migraine Verified 01/30/25 10:36    
    
angustifolia)         
     
Penicillins Allergy  Hives Verified 01/30/25 10:36    
    
    
    
    
Review of Systems    
Review of Systems    
ROS: Yes All systems reviewed with the patient and are negative except as   
otherwise documented    
    
Exam    
Vital Signs    
(past 8 hours):     
                                        -    
    
    
    
 01/31/25    
03:43    
     
Pulse Rate 91 H    
     
Respiratory Rate 18    
     
Blood Pressure [Right Arm] 146/78 H    
     
Pulse Oximetry 97    
     
Oxygen Delivery Method Room Air    
    
    
                                            
    
    
    
Oxygen Delivery Method         Room Air                                           
    
    
    
    
    
Narrative    
Exam Narrative:     
Physical Exam:    
GENERAL: The patient is not in any acute distressed. Awake and alert.    
HEENT: Nonicteric sclerae, PERRLA, EOMI. Oropharynx clear. Moist mucous   
membranes. Conjunctivae appear well perfused.    
HEART: Regular rate and rhythm without murmurs. No lower extremities edema.    
LUNGS: Clear to auscultation bilaterally. No wheezing, crackles or rhonchi    
ABDOMEN: Soft, positive bowel sounds, nontender.    
SKIN: No rash, no excessive bruising, petechiae, or purpura.    
NEUROLOGIC: AxO x 3.  Left sided weakness compared to right side.  Otherwise   
Cranial nerves II-XII intact without motor/sensory deficit.    
    
Objective    
Labs    
    
                                                        01/31/25 05:55              
    
                                                        01/31/25 05:55              
    
Labs:     
                         Laboratory Results - last 24 hr    
    
    
    
  01/30/25 01/30/25 01/31/25    
    
  12:15 12:37 05:55    
     
WBC   11.2 H  21.8 H D    
     
RBC   4.87  4.87    
     
Hgb   14.8  14.5    
     
Hct   42.6  42.5    
     
MCV   87.4  87.2    
     
MCH   30.3  29.7    
     
MCHC   34.6  34.1    
     
RDW   14.5  14.3    
     
Plt Count   359  386    
     
Neut % (Auto)   77.8 H  95.0 H    
     
Lymph % (Auto)   13.0 L  3.6 L    
     
Mono % (Auto)   5.9  0.9 L    
     
Eos % (Auto)   2.7  0.0 L    
     
Baso % (Auto)   0.6  0.5    
     
Neut # (Auto)   8700 H  49079 H    
     
Lymph # (Auto)   1400  800 L    
     
Mono # (Auto)   700  200    
     
Eos # (Auto)   300  0    
     
Baso # (Auto)   100  100    
     
Sodium   140     
     
Potassium   4.2     
     
Chloride   106     
     
Carbon Dioxide   24     
     
BUN   7     
     
Creatinine   0.80     
     
Estimated GFR   > 60     
     
BUN/Creatinine Ratio   8.8     
     
Glucose   103 H     
     
Calcium   9.5     
     
Total Bilirubin   1.0     
     
AST   33     
     
ALT   29     
     
Alkaline Phosphatase   71     
     
Total Protein   8.5 H     
     
Albumin   4.8     
     
Globulin   3.7     
     
Albumin/Globulin Ratio   1.3     
     
Urine RBC  0-1/hpf      
     
Urine WBC  1-5/hpf      
     
Ur Squamous Epith Cells  1-5 /hpf      
     
Urine Bacteria  Few (2-10) H      
     
Ur Culture Indicated?  Cult not indicated      
     
Vol Urine Centrifuged  10ml (spun)      
    
    
    
    
Assessment & Plan    
Assessment & Plan narrative:     
Acute flare up with MS with left sided weakness.  Admit the patient to medical   
telemetry.  Continue IV Solmedrol 1g q24hrs.  PT/OT.    
    
DVT PPx hep SQ    
    
Code status full code    
    
Disposition home in 2 days    
Time-Based Coding    
::     
[TOTAL MINUTES] spent with patient and on the chart (including review of chart,   
obtaining history, exam, reviewing outside data, placing orders, documenting   
exam and treatment plan, and counseling patient) on [DATE].

## 2025-02-01 VITALS
HEART RATE: 64 BPM | SYSTOLIC BLOOD PRESSURE: 148 MMHG | DIASTOLIC BLOOD PRESSURE: 92 MMHG | RESPIRATION RATE: 12 BRPM | OXYGEN SATURATION: 100 % | TEMPERATURE: 97.4 F

## 2025-02-01 VITALS
TEMPERATURE: 97.9 F | RESPIRATION RATE: 16 BRPM | SYSTOLIC BLOOD PRESSURE: 162 MMHG | HEART RATE: 54 BPM | DIASTOLIC BLOOD PRESSURE: 90 MMHG | OXYGEN SATURATION: 95 %

## 2025-02-01 VITALS
TEMPERATURE: 97.34 F | OXYGEN SATURATION: 98 % | DIASTOLIC BLOOD PRESSURE: 98 MMHG | RESPIRATION RATE: 18 BRPM | SYSTOLIC BLOOD PRESSURE: 151 MMHG | HEART RATE: 62 BPM

## 2025-02-01 VITALS
DIASTOLIC BLOOD PRESSURE: 84 MMHG | SYSTOLIC BLOOD PRESSURE: 152 MMHG | OXYGEN SATURATION: 98 % | RESPIRATION RATE: 14 BRPM | HEART RATE: 68 BPM | TEMPERATURE: 97.8 F

## 2025-02-01 RX ADMIN — METHYLPREDNISOLONE SODIUM SUCCINATE 250 MG: 1 INJECTION, POWDER, FOR SOLUTION INTRAMUSCULAR; INTRAVENOUS at 13:06

## 2025-02-01 RX ADMIN — PANTOPRAZOLE SODIUM 40 MG: 40 TABLET, DELAYED RELEASE ORAL at 06:54

## 2025-02-01 NOTE — CM.DANOTE
B DCP Assessment note

pt is a 35yo F here with MS flare due to not being able to afford her MS medications in the past. she has since switched insurances and should now have improved OOP cost coverage per provider. 

PCP Carlene Cortes 
Payer [employer] and self pay

MSW reviewed EMR. Per chart, pt lives in [facility] with spouse Abebe. Followed by Dr. Najera at  neurology. PT/OT=home with assistance. Per provider in morning rounds, dc tomorrow after another does of IV Solmedrol. 

P: anticipate home tomorrow with spouse support. no identified barriers to safe dc home with spouse and OP f/u identified at this time. CM team will continue to follow as needed

EDIS Etienne 
Discharge Planning/Care Management

CM Discharge Assessment                                    Start:  02/01/25 10:42
Freq:                                                      Status: Active        
Protocol:                                                                        
 Document     02/01/25 10:42  SL  (Rec: 02/01/25 10:43  SL  [id number])
 Discharge Planning Assessment
     Assigned Discharge Planner                  EDIS Caceres
     DPOA/Assigned Designee Name                 marilu Andrew
     Contact Information                         [phone]
     Advance Directives?                         No
     History Provided By                         Patient,Family Member,Medical
                                                 Record
     Prior Living Arrangements                   House
     Household Members                           spouse,children,other
     Type of transporation used prior to         Drives own vehicle
      admit                                      
     Independent with ADL's                      Yes
     Is patient alert and oriented?              Yes
     Barriers to Discharge                       No
     Discharge Plan                              Home
     Referrals Initiated                         None needed
     Review Status                               In Process
     Please Provide Date Initial DC              02/01/25
      Assessment Was Performed                   
     Next Review Type                            Continued Stay Review

## 2025-02-01 NOTE — P.PN_ITS
Subjective    
Subjective    
Date Patient Seen: 02/01/25    
Time Patient Seen: 10:20    
Interval history:     
Narrative:     
34 year old female with past medical history of MS, diabetes insipidus with   
pituitary tumor s/p removal presents with increasing left sided weakness.  Per   
the patient's report, the patient has noticed that her left sided extremities   
over the last few weeks.  The patient states that she is being followed by Dr. Najera at [facility] but over the last few weeks, the patient has   
not been able to afford her MS medications and hasn't been taking them.  The   
patient otherwise denies any fever, chills, nausea, vomiting, diarrhea, dysuria,  
chest pain, shortness of breath or coughing.    
In our ER,  the patient was hemodynamically stable.  The patient does have   
weakness on her left exremities.  Dr. Najera, patient neurologist was contacted  
and reccommended to obtain MRI of brain and C spine which were done and shows   
multiple leasions.  Our ER physician did report these findings back to patient   
neurologist as well as consulting Dr. Garay, neurologist on call at .  Images   
were reviewed again and recommended that the patient does not need for plasma   
exchange at this time but only high dose of IV steroids.  Dr. Garay that we   
admit the patient here with IV steroids and PT/OT. 1G of IV Solumedrol given.      
Patient reports that she has had dramatic improvement in her left leg weakness,   
and that her left hand starting to improve after her 1st dose of IV steroids.    
She notes that her symptoms had been controlled for 10 years but flared when her  
insurance would only cover Gilenya at a cost of 8000 dollars per month, she was   
unable to afford.  She is since switched to a different insurance carrier.  She   
is seen with her  Abebe and a friend at bedside.    
    
Interval history:  Patient reports some increased movement in the left hand with  
improved dexterity, though persistent numbness.  She was able to get up and walk  
though felt mildly unsteady.    
    
Exam    
Vital Signs    
(past 8 hours):     
                                        -    
    
    
    
 02/01/25    
07:00    
     
Temperature 97.4 F L    
     
Pulse Rate 64    
     
Respiratory Rate 12    
     
Blood Pressure 148/92 H    
     
Pulse Oximetry 100    
     
Oxygen Flow Rate 0    
    
    
                                            
    
    
    
Oxygen Delivery Method         Room Air                                           
    
     
Oxygen Flow Rate               0                                                  
    
    
    
    
    
Narrative    
Exam Narrative:     
GENERAL: This is a well-nourished, well-developed patient, in no apparent   
distress.    
EYES: Pupils equal round and reactive. Extraocular motions intact. No scleral   
icterus. No injection or drainage.     
ENT: Mucous membranes pink and moist.    
NECK: Supple, nontender, no meningeal signs.    
EXTREMITIES: No clubbing, cyanosis, or edema.    
NEUROLOGIC: Alert, oriented, speech fluent, full upper and lower motor strength,  
mild decreased dexterity in the left hand with subjective numbness in the left   
hand with resolution of left leg weakness and numbness except for mild   
subjective gait instability, no other focal deficits evident.    
DERMATOLOGIC: No rashes or skin lesions.    
    
    
Objective    
Labs    
    
                                                        01/31/25 05:55              
    
                                                        01/31/25 05:55              
    
    
[facility]    
Social History (Reviewed 02/01/25 @ 12:01 by Bryant Hartmann MD)    
household members:  spouse, children and other     
Smoking Status:  Former smoker     
alcohol intake:  current     
    
    
    
Assessment & Plan    
Assessment & Plan narrative:     
1. Acute flare of relapsing remitting MS with left sided weakness, in the   
setting of discontinuation of disease modifying therapy for insurance reasons..   
Admit the patient to medical telemetry.  Continue IV Solumedrol 1g q24hrs x 5   
days.  PT/OT.  If doing well we may look into completing her IV Solu-Medrol as   
an outpatient pending insurance authorization.    
    
DVT PPx : low risk, mobilization encouraged.    
    
Code status full code    
    
Disposition home in 2 days    
    
Quality    
VTE    
Deep Vein Thrombosis/Pulmonary Embolism Present on Admission: No    
    
IH PROFEE    
Charge Entry    
Document charge(s): No    
Charge Codes    
Subsequent inpatient/observation care: [id number]

## 2025-02-01 NOTE — PM.PN.IH.1
Subjective
Subjective
Date Patient Seen: 02/01/25
Time Patient Seen: 10:20
Interval history: 
Narrative: 
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  Per the patient's report, the patient has noticed that her left sided extremities over the last 
few weeks.  The patient states that she is being followed by Dr. Najera at [facility] but over the last few weeks, the patient has not been able to afford her MS medications and hasn't been taking them.  The patient otherwise denies 
any fever, chills, nausea, vomiting, diarrhea, dysuria, chest pain, shortness of breath or coughing.
In our ER,  the patient was hemodynamically stable.  The patient does have weakness on her left exremities.  Dr. Najera, patient neurologist was contacted and reccommended to obtain MRI of brain and C spine which were done and shows multiple 
leasions.  Our ER physician did report these findings back to patient neurologist as well as consulting Dr. Garay, neurologist on call at .  Images were reviewed again and recommended that the patient does not need for plasma exchange at this time 
but only high dose of IV steroids.  Dr. Garay that we admit the patient here with IV steroids and PT/OT. 1G of IV Solumedrol given.  
Patient reports that she has had dramatic improvement in her left leg weakness, and that her left hand starting to improve after her 1st dose of IV steroids.  She notes that her symptoms had been controlled for 10 years but flared when her insurance 
would only cover Gilenya at a cost of 8000 dollars per month, she was unable to afford.  She is since switched to a different insurance carrier.  She is seen with her  Abebe and a friend at bedside.

Interval history:  Patient reports some increased movement in the left hand with improved dexterity, though persistent numbness.  She was able to get up and walk though felt mildly unsteady.

Exam
Vital Signs
(past 8 hours): 
-

 02/01/25
07:00
Temperature 97.4 F L
Pulse Rate 64
Respiratory Rate 12
Blood Pressure 148/92 H
Pulse Oximetry 100
Oxygen Flow Rate 0



Oxygen Delivery Method         Room Air                                          
Oxygen Flow Rate               0                                                 



Narrative
Exam Narrative: 
GENERAL: This is a well-nourished, well-developed patient, in no apparent distress.
EYES: Pupils equal round and reactive. Extraocular motions intact. No scleral icterus. No injection or drainage. 
ENT: Mucous membranes pink and moist.
NECK: Supple, nontender, no meningeal signs.
EXTREMITIES: No clubbing, cyanosis, or edema.
NEUROLOGIC: Alert, oriented, speech fluent, full upper and lower motor strength, mild decreased dexterity in the left hand with subjective numbness in the left hand with resolution of left leg weakness and numbness except for mild subjective gait 
instability, no other focal deficits evident.
DERMATOLOGIC: No rashes or skin lesions.


Objective
Labs

01/31/25 05:55          

01/31/25 05:55          


[facility]
Social History (Reviewed 02/01/25 @ 12:01 by Bryant Hartmann MD)
household members:  spouse, children and other 
Smoking Status:  Former smoker 
alcohol intake:  current 



Assessment & Plan
Assessment & Plan narrative: 
1. Acute flare of relapsing remitting MS with left sided weakness, in the setting of discontinuation of disease modifying therapy for insurance reasons..  Admit the patient to medical telemetry.  Continue IV Solumedrol 1g q24hrs x 5 days.  PT/OT.  
If doing well we may look into completing her IV Solu-Medrol as an outpatient pending insurance authorization.

DVT PPx : low risk, mobilization encouraged.

Code status full code

Disposition home in 2 days

Quality
VTE
Deep Vein Thrombosis/Pulmonary Embolism Present on Admission: No

IH PROFEE
Charge Entry
Document charge(s): No
Charge Codes
Subsequent inpatient/observation care: [id number]

## 2025-02-02 VITALS
OXYGEN SATURATION: 100 % | HEART RATE: 50 BPM | SYSTOLIC BLOOD PRESSURE: 138 MMHG | RESPIRATION RATE: 19 BRPM | TEMPERATURE: 97.9 F | DIASTOLIC BLOOD PRESSURE: 84 MMHG

## 2025-02-02 VITALS
TEMPERATURE: 96.98 F | RESPIRATION RATE: 18 BRPM | OXYGEN SATURATION: 99 % | DIASTOLIC BLOOD PRESSURE: 81 MMHG | SYSTOLIC BLOOD PRESSURE: 142 MMHG | HEART RATE: 72 BPM

## 2025-02-02 VITALS
OXYGEN SATURATION: 97 % | SYSTOLIC BLOOD PRESSURE: 148 MMHG | RESPIRATION RATE: 19 BRPM | DIASTOLIC BLOOD PRESSURE: 94 MMHG | HEART RATE: 57 BPM | TEMPERATURE: 97.6 F

## 2025-02-02 VITALS
HEART RATE: 57 BPM | OXYGEN SATURATION: 96 % | DIASTOLIC BLOOD PRESSURE: 86 MMHG | RESPIRATION RATE: 20 BRPM | SYSTOLIC BLOOD PRESSURE: 145 MMHG | TEMPERATURE: 97.52 F

## 2025-02-02 VITALS
OXYGEN SATURATION: 98 % | DIASTOLIC BLOOD PRESSURE: 90 MMHG | HEART RATE: 47 BPM | SYSTOLIC BLOOD PRESSURE: 155 MMHG | TEMPERATURE: 97.16 F

## 2025-02-02 VITALS
SYSTOLIC BLOOD PRESSURE: 140 MMHG | DIASTOLIC BLOOD PRESSURE: 91 MMHG | HEART RATE: 53 BPM | OXYGEN SATURATION: 96 % | TEMPERATURE: 97.34 F | RESPIRATION RATE: 20 BRPM

## 2025-02-02 RX ADMIN — METHYLPREDNISOLONE SODIUM SUCCINATE 250 MG: 1 INJECTION, POWDER, FOR SOLUTION INTRAMUSCULAR; INTRAVENOUS at 13:28

## 2025-02-02 RX ADMIN — PANTOPRAZOLE SODIUM 40 MG: 40 TABLET, DELAYED RELEASE ORAL at 06:33

## 2025-02-02 NOTE — PM.PN.IH.1
Subjective
Subjective
Date Patient Seen: 02/02/25
Time Patient Seen: 08:05
Interval history: 
Narrative: 
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  Per the patient's report, the patient has noticed that her left sided extremities over the last 
few weeks.  The patient states that she is being followed by Dr. Najera at [facility] but over the last few weeks, the patient has not been able to afford her MS medications and hasn't been taking them.  The patient otherwise denies 
any fever, chills, nausea, vomiting, diarrhea, dysuria, chest pain, shortness of breath or coughing.
In our ER,  the patient was hemodynamically stable.  The patient does have weakness on her left exremities.  Dr. Najera, patient neurologist was contacted and reccommended to obtain MRI of brain and C spine which were done and shows multiple 
leasions.  Our ER physician did report these findings back to patient neurologist as well as consulting Dr. Garay, neurologist on call at .  Images were reviewed again and recommended that the patient does not need for plasma exchange at this time 
but only high dose of IV steroids.  Dr. Garay that we admit the patient here with IV steroids and PT/OT. 1G of IV Solumedrol given.  
Patient reports that she has had dramatic improvement in her left leg weakness, and that her left hand starting to improve after her 1st dose of IV steroids.  She notes that her symptoms had been controlled for 10 years but flared when her insurance 
would only cover Gilenya at a cost of 8000 dollars per month, she was unable to afford.  She is since switched to a different insurance carrier.  She is seen with her  Abebe and a friend at bedside.

Interval history:  Patient reports some improving movement in the left hand with improved dexterity, though persistent mild numbness.  She was able to get up and walk to the bathroom during the night though felt mildly unsteady.

Exam
Vital Signs
(past 8 hours): 
-

 02/02/25
04:00 02/02/25
08:00
Temperature 97.4 F L 97.9 F
Pulse Rate 53 L 50 L
Respiratory Rate 20 19
Blood Pressure 140/91 H 138/84
Pulse Oximetry 96 100
Oxygen Flow Rate 0 0



Oxygen Delivery Method         Room Air                                          
Oxygen Flow Rate               0                                                 



Narrative
Exam Narrative: 
GENERAL: This is a well-nourished, well-developed patient, in no apparent distress.
EYES: Pupils equal round and reactive. Extraocular motions intact. No scleral icterus. No injection or drainage. 
ENT: Mucous membranes pink and moist.
NECK: Supple, nontender, no meningeal signs.
EXTREMITIES: No clubbing, cyanosis, or edema.
NEUROLOGIC: Alert, oriented, speech fluent, full upper and lower motor strength, mild decreased dexterity in the left hand with subjective numbness in the left hand with resolution of left leg weakness and numbness except for mild subjective gait 
instability, no other focal deficits evident.
DERMATOLOGIC: No rashes or skin lesions.


Objective
Labs

01/31/25 05:55          

01/31/25 05:55          


[facility]
Social History (Reviewed 02/02/25 @ 09:16 by Bryant Hartmann MD)
household members:  spouse, children and other 
Smoking Status:  Former smoker 
alcohol intake:  current 



Assessment & Plan
Assessment & Plan narrative: 
1. Acute flare of relapsing remitting MS with left sided weakness, in the setting of discontinuation of disease modifying therapy (Gilenya) for insurance reasons.  Admitted to inpatient medical floor status.  Continue IV Solumedrol 1g q24hrs x 5 
days.  PT/OT.  If doing well we may look into completing her IV Solu-Medrol as an outpatient pending insurance authorization but reportedly her insurance office is closed over the weekend so she will likely complete as an inpatient.

DVT PPx : low risk, mobilization encouraged.

Code status full code

Disposition home in 2 days

Quality
VTE
Deep Vein Thrombosis/Pulmonary Embolism Present on Admission: No

IH PROFEE
Charge Entry
Document charge(s): No
Charge Codes
Subsequent inpatient/observation care: [id number]

## 2025-02-02 NOTE — P.PN_ITS
Subjective    
Subjective    
Date Patient Seen: 02/02/25    
Time Patient Seen: 08:05    
Interval history:     
Narrative:     
34 year old female with past medical history of MS, diabetes insipidus with   
pituitary tumor s/p removal presents with increasing left sided weakness.  Per   
the patient's report, the patient has noticed that her left sided extremities   
over the last few weeks.  The patient states that she is being followed by Dr. Najera at [facility] but over the last few weeks, the patient has   
not been able to afford her MS medications and hasn't been taking them.  The   
patient otherwise denies any fever, chills, nausea, vomiting, diarrhea, dysuria,  
chest pain, shortness of breath or coughing.    
In our ER,  the patient was hemodynamically stable.  The patient does have   
weakness on her left exremities.  Dr. Najera, patient neurologist was contacted  
and reccommended to obtain MRI of brain and C spine which were done and shows   
multiple leasions.  Our ER physician did report these findings back to patient   
neurologist as well as consulting Dr. Garay, neurologist on call at .  Images   
were reviewed again and recommended that the patient does not need for plasma   
exchange at this time but only high dose of IV steroids.  Dr. Garay that we   
admit the patient here with IV steroids and PT/OT. 1G of IV Solumedrol given.      
Patient reports that she has had dramatic improvement in her left leg weakness,   
and that her left hand starting to improve after her 1st dose of IV steroids.    
She notes that her symptoms had been controlled for 10 years but flared when her  
insurance would only cover Gilenya at a cost of 8000 dollars per month, she was   
unable to afford.  She is since switched to a different insurance carrier.  She   
is seen with her  Abebe and a friend at bedside.    
    
Interval history:  Patient reports some improving movement in the left hand with  
improved dexterity, though persistent mild numbness.  She was able to get up and  
walk to the bathroom during the night though felt mildly unsteady.    
    
Exam    
Vital Signs    
(past 8 hours):     
                                        -    
    
    
    
 02/02/25    
04:00 02/02/25    
08:00    
     
Temperature 97.4 F L 97.9 F    
     
Pulse Rate 53 L 50 L    
     
Respiratory Rate 20 19    
     
Blood Pressure 140/91 H 138/84    
     
Pulse Oximetry 96 100    
     
Oxygen Flow Rate 0 0    
    
    
                                            
    
    
    
Oxygen Delivery Method         Room Air                                           
    
     
Oxygen Flow Rate               0                                                  
    
    
    
    
    
Narrative    
Exam Narrative:     
GENERAL: This is a well-nourished, well-developed patient, in no apparent   
distress.    
EYES: Pupils equal round and reactive. Extraocular motions intact. No scleral   
icterus. No injection or drainage.     
ENT: Mucous membranes pink and moist.    
NECK: Supple, nontender, no meningeal signs.    
EXTREMITIES: No clubbing, cyanosis, or edema.    
NEUROLOGIC: Alert, oriented, speech fluent, full upper and lower motor strength,  
mild decreased dexterity in the left hand with subjective numbness in the left   
hand with resolution of left leg weakness and numbness except for mild   
subjective gait instability, no other focal deficits evident.    
DERMATOLOGIC: No rashes or skin lesions.    
    
    
Objective    
Labs    
    
                                                        01/31/25 05:55              
    
                                                        01/31/25 05:55              
    
    
[facility]    
Social History (Reviewed 02/02/25 @ 09:16 by Bryant Hartmann MD)    
household members:  spouse, children and other     
Smoking Status:  Former smoker     
alcohol intake:  current     
    
    
    
Assessment & Plan    
Assessment & Plan narrative:     
1. Acute flare of relapsing remitting MS with left sided weakness, in the   
setting of discontinuation of disease modifying therapy (Gilenya) for insurance   
reasons.  Admitted to inpatient medical floor status.  Continue IV Solumedrol 1g  
q24hrs x 5 days.  PT/OT.  If doing well we may look into completing her IV Solu-  
Medrol as an outpatient pending insurance authorization but reportedly her   
insurance office is closed over the weekend so she will likely complete as an   
inpatient.    
    
DVT PPx : low risk, mobilization encouraged.    
    
Code status full code    
    
Disposition home in 2 days    
    
Quality    
VTE    
Deep Vein Thrombosis/Pulmonary Embolism Present on Admission: No    
    
IH PROFEE    
Charge Entry    
Document charge(s): No    
Charge Codes    
Subsequent inpatient/observation care: [id number]

## 2025-02-02 NOTE — PT.IPTN
Current Diagnoses

Multiple sclerosis (01/31/25)

Physical Therapy Treatment Note

M2 PT-IP Current Condition                                 Start:  01/31/25 12:54
Freq:   AS NEEDED                                          Status: Active        
Protocol:                                                                        
 Document     01/31/25 11:00  AB  (Rec: 01/31/25 13:07  AB  [id number])
 Physical Therapy Current Condition
     Current Condition
      Evaluation Date                            01/31/25
      Treatment Diagnosis                        MS exacerbation; difficulty in
                                                 walking
      Onset Date                                 0131/25
M3 PT-IP Subjective                                        Start:  01/31/25 12:54
Freq:   AS NEEDED                                          Status: Active        
Protocol:                                                                        
 Document     02/02/25 12:18  KS  (Rec: 02/02/25 13:39  KS  [id number])
 Subjective
     Physical Therapy Visit Type
      Type                                       Treatment Note
      Visit Start Time                           12:18
      Visit Stop Time                            12:33
      Number of PTA Visits                       1
     Physical Therapy Visit Comments
      Patient Comments                           agreeable to PT,  is at
                                                 bedside
M4 PT-IP Mobility and Gait                                 Start:  01/31/25 12:54
Freq:   AS NEEDED                                          Status: Active        
Protocol:                                                                        
 Document     02/02/25 12:18  KS  (Rec: 02/02/25 13:39  KS  [id number])
 PT-Bed Mobility Assessment
     Supine to Sit
      Supine to Sit                              Independent
     Sit to Supine
      Sit to Supine                              Independent
 PT-Transfer Assessment
     Sit to and From Stand
      Sit to and from Stand                      Standby Assistance
     Equipment
      Transfer Assistive Device                  Front Wheeled Walker
     Transfers
      Transfer Destination                       Bed
      Transfer Technique                         ambulated
     Comments
      Mobility Comments                          Pt in bed upon arrival and
                                                 agreeable to ambulate. I for
                                                 bed mobility, CGA for sit<>
                                                 stand and ambulation. PT able
                                                 to ambulate ~200 ft and states
                                                 she is feeling better but
                                                 still somewhat wobbly in L leg
                                                 and numb in L hand. Good use
                                                 of FWW and no LOB.
 Gait Assessment
     Gait
      Gait Assistance Required:                  Contact Guard Assist,1 Person
                                                 Assist
      Distance (Feet)                            200
     Assistive Devices
      Assistive Device                           Gait Belt,Front Wheeled Walker
     Gait Deviations
      General Gait Pattern                       Antalgic,Ataxic,Decreased
                                                 Stride Length,Decreased Feet
                                                 Clearance,Step-to Gait
     Factors Limiting Gait Function
      Factors Limiting Gait Function             Decreased Activity Tolerance,
                                                 Decreased Strength,Poor
                                                 Balance
     Comments
      Gait Comments                              Weakness/wobbly in L leg but
                                                 no LOB.
 PT-Balance Assessment
     Sitting Balance and Reactions
      Static Sitting Balance Ability             Normal
      Dynamic Sitting Balance Ability            Good
     Standing Balance and Reactions
      Static Standing Balance Ability            Good
      Dynamic Standing Balance Ability           Fair
      Device Used                                FWW
M5 PT-IP Objective Assessments                             Start:  01/31/25 12:54
Freq:   AS NEEDED                                          Status: Active        
Protocol:                                                                        
 Document     01/31/25 11:00  AB  (Rec: 01/31/25 13:07  AB  [id number])
 Orientation
     Orientation/Cognition
      Level of Alertness                         Alert
      Orientation                                Name,Age,Birthday,Month,Date,
                                                 Year,Day of Week,Place,
                                                 Situation
      Language Function Ability                  No Deficits Noted
      Safety Awareness                           Understands Safety Issues
      Memory Description                         No Deficits Noted
 Gross Range of Motion
     Lower Extremity ROM
      Assessment                                 Within Functional Limits
 Strength
     Lower Extremity Strength
      Assessment                                 Left Impaired
      Hip                                        3+/5
      Knee                                       3+/5
 Sensation Assessment
     Sensation
      Sensation Description                      Numbness
     Comments
      Sensation Comments                         L sided numbness
M6 PT-IP Treatment                                         Start:  01/31/25 12:54
Freq:   AS NEEDED                                          Status: Active        
Protocol:                                                                        
 Document     02/02/25 12:18  KS  (Rec: 02/02/25 13:39  KS  [id number])
 Physical Therapy Treatment
     Education
      Education Provided                         Safety
M7 PT-IP Assessment and Plan                               Start:  01/31/25 12:54
Freq:   AS NEEDED                                          Status: Active        
Protocol:                                                                        
 Document     02/02/25 12:18  KS  (Rec: 02/02/25 13:39  KS  [id number])
 PT Summary Assessment and Plan
     Potential
      Rehabilitation Potential                   Fair
     Summary
      Impairments                                Pain,ROM,Strength,Balance,
                                                 Coordination,Sensation,Tone,
                                                 Cognition,Bed Mobility,
                                                 Transfers,Gait,Activity
                                                 Tolerance
      Progress Towards Goals                     Slow Progress due to Medical
                                                 Issues
      Assessment Summary                         Pt was able to complete 200 ft
                                                 ambulation w/ FWW today. She
                                                 has a FWW at home and support
                                                 from her . She offers
                                                 that she still has numbness in
                                                 L hand and is weak in L leg.
     Goals
      Transfer Goal                              Independent,Front Wheeled
                                                 Walker
      Gait Goal                                  Independent,Front Wheel Walker
      Gait Distance                              200
      Other Goals                                improve transfers and
                                                 ambulation using SPC/without
                                                 AD mod I ~ 300 ft
                                                 up/down 4 steps B rails SBA
      Days to Meet Goals                         10
     Frequency of Treatment
      Frequency Of Treatment                     Once a Day
      Other frequency                            +1
     Treatment Plan
      Physical Therapy Treatment Plan            Bed Mobility Training,Transfer
                                                 Training,Gait Training,
                                                 Therapeutic Exercise,Balance
                                                 Retraining,Discharge Planning,
                                                 Hot or Cold Pack,Neuromuscular
                                                 Re-ed,Coordination Retraining
     Recommendations To Nursing
      Amount of Assist Needed                    1 Person Assist
     Discharge Recommendations
      PT Discharge Recommendations               Home with Assistance
      Transportation Needs at Discharge          Private Vehicle

## 2025-02-03 VITALS
SYSTOLIC BLOOD PRESSURE: 145 MMHG | RESPIRATION RATE: 20 BRPM | DIASTOLIC BLOOD PRESSURE: 88 MMHG | TEMPERATURE: 97.1 F | HEART RATE: 50 BPM | OXYGEN SATURATION: 97 %

## 2025-02-03 VITALS
DIASTOLIC BLOOD PRESSURE: 90 MMHG | RESPIRATION RATE: 19 BRPM | HEART RATE: 50 BPM | OXYGEN SATURATION: 98 % | SYSTOLIC BLOOD PRESSURE: 136 MMHG | TEMPERATURE: 97.4 F

## 2025-02-03 RX ADMIN — PANTOPRAZOLE SODIUM 40 MG: 40 TABLET, DELAYED RELEASE ORAL at 06:04

## 2025-02-03 RX ADMIN — METHYLPREDNISOLONE SODIUM SUCCINATE 250 MG: 1 INJECTION, POWDER, FOR SOLUTION INTRAMUSCULAR; INTRAVENOUS at 12:01

## 2025-02-03 NOTE — P.DS_ITS
History of Present Illness    
History of Present Illness    
Date Patient Seen: 02/03/25    
Time Patient Seen: 14:23    
Date of Onset of Symptoms: 01/30/25    
Chief complaint: MS flare    
Narrative:     
From night physician     
34 year old female with past medical history of MS, diabetes insipidus with   
pituitary tumor s/p removal presents with increasing left sided weakness.  Per   
the patient's report, the patient has noticed that her left sided extremities   
over the last few weeks.  The patient states that she is being followed by Dr. Najera at [facility] neurology but over the last few weeks, the patient has   
not been able to afford her MS medications and hasn't been taking them.  The   
patient otherwise denies any fever, chills, nausea, vomiting, diarrhea, dysuria,  
chest pain, shortness of breath or coughing.    
In our ER,  the patient was hemodynamically stable.  The patient does have   
weakness on her left exremities.  Dr. Najera, patient neurologist was contacted  
and reccommended to obtain MRI of brain and C spine which were done and shows   
multiple leasions.  Our ER physician did report these findings back to patient   
neurologist as well as consulting Dr. Garay, neurologist on call at .  Images   
were reviewed again and recommended that the patient does not need for plasma   
exchange at this time but only high dose of IV steroids.  Dr. Garay that we   
admit the patient here with IV steroids and PT/OT. 1G of IV Solumedrol given.      
    
Interval history    
Patient reports that she has had dramatic improvement in her left leg weakness,   
and that her left hand starting to improve after her 1st dose of IV steroids.    
She notes that her symptoms had been controlled for 10 years but flared when her  
insurance would only cover Gilenya at a cost of 8000 dollars per month, she was   
unable to afford.  She is since switched to a different insurance carrier.  She   
is seen with her  Abebe and a friend at bedside.    
    
Discharge Providers    
Provider    
Date of admission:     
01/31/25 02:31    
    
Discharge Date: 02/04/25    
Primary care physician:     
Carlene Delaney MD    
    
Consults:     
                                            
    
01/31/25 02:42    
Consult to Occupational Therapy Evaluate & Treat     
   Comment:     
   Physician Instructions: Evaluate and treat    
Consult to Physical Therapy Evaluate & Treat     
   Comment:     
   Physician Instructions: Evaluate and Treat    
    
    
    
Discharge provider:     
Chandra Grace DO    
    
    
Summary    
Hospital Course    
Discharge Diagnosis:     
1. Acute flare of relapsing remitting MS with left sided weakness, in the   
setting of discontinuation of disease modifying therapy (Gilenya) for insurance   
reasons.     
Hospital Course:     
Is a 34-year-old female with a past medical history of multiple sclerosis who   
was admitted with left-sided weakness and is on MRI consists sent with multiple   
sclerosis flare after discontinuation of disease modifying therapy for insurance  
reasons as an outpatient.  She had slow improvement in her symptoms with   
initiation of high-dose steroids over the course of 5 days.  She was recommended  
for discharge home after physical and occupational therapy evaluations.  I   
attempted to reach out to her neurology office for further guidance on   
management after discharge but did not receive a call back.  I recommended that   
she contact the clinic and schedule follow-up as soon as possible.    
Time Spent with Patient    
Time spent: Less than 30 minutes    
    
Exam    
Vital Signs    
(past 8 hours):     
                                        -    
    
    
    
 02/03/25    
07:48    
     
Temperature 97.4 F L    
     
Pulse Rate 50 L    
     
Respiratory Rate 19    
     
Blood Pressure 136/90    
     
Pulse Oximetry 98    
     
Oxygen Flow Rate 0    
    
    
                                            
    
    
    
Oxygen Delivery Method         Room Air                                           
    
     
Oxygen Flow Rate               0                                                  
    
    
    
    
    
Narrative    
Exam Narrative:     
GENERAL: This is a well-nourished, well-developed patient, in no apparent   
distress.    
EYES: Pupils equal round and reactive. Extraocular motions intact. No scleral   
icterus. No injection or drainage.     
ENT: Mucous membranes pink and moist.    
NECK: Supple, nontender, no meningeal signs.    
EXTREMITIES: No clubbing, cyanosis, or edema.    
NEUROLOGIC: Alert, oriented, speech fluent, full upper and lower motor strength,  
mild decreased dexterity in the left hand with subjective numbness in the left   
hand with resolution of left leg weakness and numbness except for mild   
subjective gait instability, no other focal deficits evident.    
DERMATOLOGIC: No rashes or skin lesions.    
    
    
Objective    
Labs    
    
                                                        01/31/25 05:55              
    
                                                        01/31/25 05:55              
    
    
[facility]    
Social History (Reviewed 02/02/25 @ 09:16 by Bryant Hartmann MD)    
household members:  spouse, children and other     
Smoking Status:  Former smoker     
alcohol intake:  current     
    
    
    
Discharge Plan    
Discharge Plan    
Patient Disposition: Home    
    
Provider Discharge Comment: You were admitted to the hospital with an MS flare.   
I was unable to reach your neurologist thus far, I will call if steroids are   
recommended after discharge, but typically they are not. Please follow up with   
your neurologist as soon as possible after discharge.     
    
    
Discharge orders & Medications    
Prescriptions:    
Continued    
  ergocalciferol (vitamin D2) 1,250 mcg (50,000 unit) capsule     
   1,250 mcg PO DAILY     
  desmopressin 0.1 mg tablet     
   0.05 mg PO BID     
   Patient Comments:    
   TAKE HALF TABLET IN THE MORNING AND 1 TABLET AT BEDTIME DAILY. MAY TAKE AN   
ADDITIONAL HALF TABLET AS NEEDED ON WARMER DAYS.    
   Rx Instructions:    
   half tab am, 1 tab hs, may have up to 2 tabs daily    
    
Follow up/Referrals:    
Carlene Delaney MD [Primary Care Provider] -     
    
Diet/Activity/Treatments    
Diet: Diet as Tolerated and Regular    
    
Activity: As tolerated, no restrictions     
    
    
Visit Report/Discharge Packet    
Instructions:  Multiple Sclerosis Exacerbations    
    
Stand Alone Forms:  Patient Portal/API, Stroke Signs & Symptoms    
    
Discharge Data    
Primary Care Provider: Carlene Delaney    
    
    
Quality    
VTE    
Deep Vein Thrombosis/Pulmonary Embolism Present on Admission: No

## 2025-02-03 NOTE — CM.DPNOTE
DCP note

MSW reviewed EMR

Per chart review/RN report/provider in morning rounds, pt will finish steroids today and will dc home this afternoon. 

P: anticipate home today with spouse support. no identified barriers to safe dc home with spouse and OP f/u identified at this time. CM team will continue to follow as needed

EDIS Etienne

## 2025-02-03 NOTE — PM.DS.1
History of Present Illness
History of Present Illness
Date Patient Seen: 02/03/25
Time Patient Seen: 14:23
Date of Onset of Symptoms: 01/30/25
Chief complaint: MS flare
Narrative: 
From night physician 
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  Per the patient's report, the patient has noticed that her left sided extremities over the last 
few weeks.  The patient states that she is being followed by Dr. Najera at [facility] neurology but over the last few weeks, the patient has not been able to afford her MS medications and hasn't been taking them.  The patient otherwise denies 
any fever, chills, nausea, vomiting, diarrhea, dysuria, chest pain, shortness of breath or coughing.
In our ER,  the patient was hemodynamically stable.  The patient does have weakness on her left exremities.  Dr. Najera, patient neurologist was contacted and reccommended to obtain MRI of brain and C spine which were done and shows multiple 
leasions.  Our ER physician did report these findings back to patient neurologist as well as consulting Dr. Garay, neurologist on call at .  Images were reviewed again and recommended that the patient does not need for plasma exchange at this time 
but only high dose of IV steroids.  Dr. Garay that we admit the patient here with IV steroids and PT/OT. 1G of IV Solumedrol given.  

Interval history
Patient reports that she has had dramatic improvement in her left leg weakness, and that her left hand starting to improve after her 1st dose of IV steroids.  She notes that her symptoms had been controlled for 10 years but flared when her insurance 
would only cover Gilenya at a cost of 8000 dollars per month, she was unable to afford.  She is since switched to a different insurance carrier.  She is seen with her  Abebe and a friend at bedside.

Discharge Providers
Provider
Date of admission: 
01/31/25 02:31

Discharge Date: 02/04/25
Primary care physician: 
Carlene Delaney MD

Consults: 


01/31/25 02:42
Consult to Occupational Therapy Evaluate & Treat 
 Comment: 
 Physician Instructions: Evaluate and treat
Consult to Physical Therapy Evaluate & Treat 
 Comment: 
 Physician Instructions: Evaluate and Treat



Discharge provider: 
Chandra Grace DO


Summary
Hospital Course
Discharge Diagnosis: 
1. Acute flare of relapsing remitting MS with left sided weakness, in the setting of discontinuation of disease modifying therapy (Gilenya) for insurance reasons. 
Hospital Course: 
Is a 34-year-old female with a past medical history of multiple sclerosis who was admitted with left-sided weakness and is on MRI consists sent with multiple sclerosis flare after discontinuation of disease modifying therapy for insurance reasons as 
an outpatient.  She had slow improvement in her symptoms with initiation of high-dose steroids over the course of 5 days.  She was recommended for discharge home after physical and occupational therapy evaluations.  I attempted to reach out to her 
neurology office for further guidance on management after discharge but did not receive a call back.  I recommended that she contact the clinic and schedule follow-up as soon as possible.
Time Spent with Patient
Time spent: Less than 30 minutes

Exam
Vital Signs
(past 8 hours): 
-

 02/03/25
07:48
Temperature 97.4 F L
Pulse Rate 50 L
Respiratory Rate 19
Blood Pressure 136/90
Pulse Oximetry 98
Oxygen Flow Rate 0



Oxygen Delivery Method         Room Air                                          
Oxygen Flow Rate               0                                                 



Narrative
Exam Narrative: 
GENERAL: This is a well-nourished, well-developed patient, in no apparent distress.
EYES: Pupils equal round and reactive. Extraocular motions intact. No scleral icterus. No injection or drainage. 
ENT: Mucous membranes pink and moist.
NECK: Supple, nontender, no meningeal signs.
EXTREMITIES: No clubbing, cyanosis, or edema.
NEUROLOGIC: Alert, oriented, speech fluent, full upper and lower motor strength, mild decreased dexterity in the left hand with subjective numbness in the left hand with resolution of left leg weakness and numbness except for mild subjective gait 
instability, no other focal deficits evident.
DERMATOLOGIC: No rashes or skin lesions.


Objective
Labs

01/31/25 05:55          

01/31/25 05:55          


[facility]
Social History (Reviewed 02/02/25 @ 09:16 by Bryant Hartmann MD)
household members:  spouse, children and other 
Smoking Status:  Former smoker 
alcohol intake:  current 



Discharge Plan
Discharge Plan
Patient Disposition: Home

Provider Discharge Comment: You were admitted to the hospital with an MS flare. I was unable to reach your neurologist thus far, I will call if steroids are recommended after discharge, but typically they are not. Please follow up with your 
neurologist as soon as possible after discharge. 


Discharge orders & Medications
Prescriptions:
Continued
  ergocalciferol (vitamin D2) 1,250 mcg (50,000 unit) capsule 
   1,250 mcg PO DAILY 
  desmopressin 0.1 mg tablet 
   0.05 mg PO BID 
   Patient Comments:
   TAKE HALF TABLET IN THE MORNING AND 1 TABLET AT BEDTIME DAILY. MAY TAKE AN ADDITIONAL HALF TABLET AS NEEDED ON WARMER DAYS.
   Rx Instructions:
   half tab am, 1 tab hs, may have up to 2 tabs daily

Follow up/Referrals:
Carlene Delaney MD [Primary Care Provider] - 

Diet/Activity/Treatments
Diet: Diet as Tolerated and Regular

Activity: As tolerated, no restrictions 


Visit Report/Discharge Packet
Instructions:  Multiple Sclerosis Exacerbations

Stand Alone Forms:  Patient Portal/API, Stroke Signs & Symptoms

Discharge Data
Primary Care Provider: Carlene Dleaney


Quality
VTE
Deep Vein Thrombosis/Pulmonary Embolism Present on Admission: No

## 2025-02-03 NOTE — PT.IPTN
Current Diagnoses

Multiple sclerosis (01/31/25)

Physical Therapy Treatment Note

M2 PT-IP Current Condition                                 Start:  01/31/25 12:54
Freq:   AS NEEDED                                          Status: Active        
Protocol:                                                                        
 Document     02/03/25 10:00  SP  (Rec: 02/03/25 10:31  SP  [id number])
 Physical Therapy Current Condition
     Current Condition
      Evaluation Date                            01/31/25
      Treatment Diagnosis                        MS exacerbation; difficulty in
                                                 walking
      Onset Date                                 0131/25
M3 PT-IP Subjective                                        Start:  01/31/25 12:54
Freq:   AS NEEDED                                          Status: Active        
Protocol:                                                                        
 Document     02/03/25 10:00  SP  (Rec: 02/03/25 10:31  SP  [id number])
 Subjective
     Physical Therapy Visit Type
      Type                                       Treatment Note
      Visit Start Time                           10:00
      Visit Stop Time                            10:08
      Notes                                       room when arrived.
      Number of PTA Visits                       1
     Physical Therapy Visit Comments
      Patient Comments                           Pt seated at room window,
                                                 agreeable to working with PTA.
      Patient Goals                              return home with spouse.
M4 PT-IP Mobility and Gait                                 Start:  01/31/25 12:54
Freq:   AS NEEDED                                          Status: Active        
Protocol:                                                                        
 Document     02/03/25 10:00  SP  (Rec: 02/03/25 10:31  SP  [id number])
 PT-Transfer Assessment
     Sit to and From Stand
      Sit to and from Stand                      Independent,Use of Upper
                                                 Extremities
     Equipment
      Transfer Assistive Device                  None,Gait Belt,Straight Cane,
                                                 Front Wheeled Walker
      Orthotic/Prosthetic Devices or Brace:      No
     Transfers
      Transfer Destination                       Bed
      Transfer Technique                         ambulated /c FWW &SPC
     Transfer Ability
      Level of Assist                            Standby Assistance,Use of
                                                 Upper Extremities
     Comments
      Mobility Comments                          Pt seated at window bench when
                                                 arrived, no AD nearby nor
                                                 shoes/nonskid socks donned.
                                                 Education regular socks are
                                                 slip hema on our floors at
                                                 hospital. PTA provided
                                                 slippers and pt donned. Gait
                                                 around room FWW Mod I 30 ft,
                                                 use SPC in RUE proper pattern
                                                 with LLE, noted little
                                                 unstable support cued R
                                                 humeral ADD elbow closer to
                                                 side scap stability and SPC
                                                 positioned closer to herself
                                                 with smaller stride with gait
                                                 stability. Gait into hallway
                                                 approx 50 ft , little sway
                                                 during pivot with sway but
                                                 recovery  SBA. Mod I FWW. Pt
                                                 returned to chair room,
                                                 discussed FWW use and HHPT for
                                                 progress strength and balance
                                                 return to PLOF no AD with
                                                 agreement.
 Gait Assessment
     Gait
      Gait Assistance Required:                  Independent
      Distance (Feet)                            50
     Assistive Devices
      Assistive Device                           Gait Belt,Front Wheeled Walker
      Orthotic/Prosthetic Devices or Brace:      No
     Gait Deviations
      General Gait Pattern                       Antalgic,Narrow Based Gait
     Factors Limiting Gait Function
      Factors Limiting Gait Function             Decreased Activity Tolerance,
                                                 Decreased Strength,Poor Safety
                                                 Awareness
     Comments
      Gait Comments                              See mobility comments: Mod I
                                                 with FWW, SBA trunk sways and
                                                 unsteady use SPC  at this time
                                                 due to decreased strength and
                                                 balance.
 Stair Climbing Assessment
     Comments
      Stair Climbing Comments                    Pt declined stair mgt felt
                                                 confident BUE on 1 HR.
 PT-Balance Assessment
     Sitting Balance and Reactions
      Static Sitting Balance Ability             Normal
      Dynamic Sitting Balance Ability            Normal
     Standing Balance and Reactions
      Static Standing Balance Ability            Normal
      Dynamic Standing Balance Ability           Good
      Device Used                                FWW, fair SPC
M5 PT-IP Objective Assessments                             Start:  01/31/25 12:54
Freq:   AS NEEDED                                          Status: Active        
Protocol:                                                                        
 Document     01/31/25 11:00  AB  (Rec: 01/31/25 13:07  AB  [id number])
 Orientation
     Orientation/Cognition
      Level of Alertness                         Alert
      Orientation                                Name,Age,Birthday,Month,Date,
                                                 Year,Day of Week,Place,
                                                 Situation
      Language Function Ability                  No Deficits Noted
      Safety Awareness                           Understands Safety Issues
      Memory Description                         No Deficits Noted
 Gross Range of Motion
     Lower Extremity ROM
      Assessment                                 Within Functional Limits
 Strength
     Lower Extremity Strength
      Assessment                                 Left Impaired
      Hip                                        3+/5
      Knee                                       3+/5
 Sensation Assessment
     Sensation
      Sensation Description                      Numbness
     Comments
      Sensation Comments                         L sided numbness
M6 PT-IP Treatment                                         Start:  01/31/25 12:54
Freq:   AS NEEDED                                          Status: Active        
Protocol:                                                                        
 Document     02/03/25 10:00  SP  (Rec: 02/03/25 10:31  SP  [id number])
 Physical Therapy Treatment
     Education
      Education Provided                         Safety
M7 PT-IP Assessment and Plan                               Start:  01/31/25 12:54
Freq:   AS NEEDED                                          Status: Active        
Protocol:                                                                        
 Document     02/03/25 10:00  SP  (Rec: 02/03/25 10:31  SP  [id number])
 PT Summary Assessment and Plan
     Potential
      Rehabilitation Potential                   Fair
     Summary
      Impairments                                Pain,ROM,Strength,Balance,
                                                 Coordination,Sensation,Tone,
                                                 Cognition,Bed Mobility,
                                                 Transfers,Gait,Activity
                                                 Tolerance
      Progress Towards Goals                     Slow Progress due to Medical
                                                 Issues,Slow Progress due to
                                                 Activity Tolerance
      Assessment Summary                         Pt Mod I with FWW met goals,
                                                 PT to DC from PT. Discussed
                                                 continue use FWW due to
                                                 decreased strength and balance
                                                 with SPC right now, trunk
                                                 sway but no LOB self recovery.
                                                 Recommending HHPT for
                                                 progressing strengthbalance
                                                 return to PLOF no AD use. Pt
                                                 ok to return home /c 
                                                 when medically cleared
                                                 available as needed. PTA
                                                 notified supervisitn PT to DC
                                                 this pt from caseload, met
                                                 goals.
     Goals
      Transfer Goal                              Independent,Front Wheeled
                                                 Walker
      Gait Goal                                  Independent,Front Wheel Walker
      Gait Distance                              200
      Other Goals                                improve transfers and
                                                 ambulation using SPC/without
                                                 AD mod I ~ 300 ft
                                                 up/down 4 steps B rails SBA
      Days to Meet Goals                         10
     Frequency of Treatment
      Frequency Of Treatment                     Once a Day
      Other frequency                            +1
     Treatment Plan
      Physical Therapy Treatment Plan            Bed Mobility Training,Transfer
                                                 Training,Gait Training,
                                                 Therapeutic Exercise,Balance
                                                 Retraining,Discharge Planning,
                                                 Hot or Cold Pack,Neuromuscular
                                                 Re-ed,Coordination Retraining
     Recommendations To Nursing
      Amount of Assist Needed                    Independent
     Discharge Recommendations
      PT Discharge Recommendations               Home with Assistance
      Transportation Needs at Discharge          Private Vehicle

## 2025-02-03 NOTE — PC.NURSE
Pt is dressed and ready for discharge home with Spouse. IV has been removed. Went over d/c instructions with Pt and Spouse-no new meds assigned to Pt and Pt is very familiar with the meds she is continuing on with. Reviewed stroke education, 
encouraged Pt to drink plenty of fluids to prevent constipation or dehydration and answered all questions. Pt denied further questions and will be taken out via w/c by CNA to POV with Spouse and all belongings.